# Patient Record
Sex: MALE | Race: WHITE | NOT HISPANIC OR LATINO | Employment: OTHER | URBAN - NONMETROPOLITAN AREA
[De-identification: names, ages, dates, MRNs, and addresses within clinical notes are randomized per-mention and may not be internally consistent; named-entity substitution may affect disease eponyms.]

---

## 2017-09-04 ENCOUNTER — APPOINTMENT (OUTPATIENT)
Dept: CT IMAGING | Facility: HOSPITAL | Age: 62
End: 2017-09-04

## 2017-09-04 ENCOUNTER — APPOINTMENT (OUTPATIENT)
Dept: ULTRASOUND IMAGING | Facility: HOSPITAL | Age: 62
End: 2017-09-04

## 2017-09-04 ENCOUNTER — HOSPITAL ENCOUNTER (EMERGENCY)
Facility: HOSPITAL | Age: 62
Discharge: HOME | End: 2017-09-04
Attending: EMERGENCY MEDICINE | Admitting: EMERGENCY MEDICINE

## 2017-09-04 VITALS
RESPIRATION RATE: 18 BRPM | WEIGHT: 315 LBS | HEIGHT: 73 IN | SYSTOLIC BLOOD PRESSURE: 120 MMHG | BODY MASS INDEX: 41.75 KG/M2 | DIASTOLIC BLOOD PRESSURE: 70 MMHG | TEMPERATURE: 97.7 F | OXYGEN SATURATION: 94 % | HEART RATE: 66 BPM

## 2017-09-04 DIAGNOSIS — N50.89 SCROTAL EDEMA: Primary | ICD-10-CM

## 2017-09-04 LAB
ALBUMIN SERPL-MCNC: 3.8 G/DL (ref 3.5–5)
ALBUMIN/GLOB SERPL: 1.2 G/DL (ref 1–2)
ALP SERPL-CCNC: 60 U/L (ref 38–126)
ALT SERPL W P-5'-P-CCNC: 34 U/L (ref 13–69)
ANION GAP SERPL CALCULATED.3IONS-SCNC: 13.5 MMOL/L
AST SERPL-CCNC: 26 U/L (ref 15–46)
BASOPHILS # BLD AUTO: 0.07 10*3/MM3 (ref 0–0.2)
BASOPHILS NFR BLD AUTO: 1 % (ref 0–2.5)
BILIRUB SERPL-MCNC: 1.3 MG/DL (ref 0.2–1.3)
BILIRUB UR QL STRIP: NEGATIVE
BUN BLD-MCNC: 17 MG/DL (ref 7–20)
BUN/CREAT SERPL: 13.1 (ref 6.3–21.9)
CALCIUM SPEC-SCNC: 9.4 MG/DL (ref 8.4–10.2)
CHLORIDE SERPL-SCNC: 102 MMOL/L (ref 98–107)
CLARITY UR: CLEAR
CO2 SERPL-SCNC: 25 MMOL/L (ref 26–30)
COLOR UR: YELLOW
CREAT BLD-MCNC: 1.3 MG/DL (ref 0.6–1.3)
DEPRECATED RDW RBC AUTO: 52.3 FL (ref 37–54)
EOSINOPHIL # BLD AUTO: 0.04 10*3/MM3 (ref 0–0.7)
EOSINOPHIL NFR BLD AUTO: 0.5 % (ref 0–7)
ERYTHROCYTE [DISTWIDTH] IN BLOOD BY AUTOMATED COUNT: 15.5 % (ref 11.5–14.5)
GFR SERPL CREATININE-BSD FRML MDRD: 56 ML/MIN/1.73
GLOBULIN UR ELPH-MCNC: 3.2 GM/DL
GLUCOSE BLD-MCNC: 93 MG/DL (ref 74–98)
GLUCOSE UR STRIP-MCNC: NEGATIVE MG/DL
HCT VFR BLD AUTO: 47.8 % (ref 42–52)
HGB BLD-MCNC: 16.5 G/DL (ref 14–18)
HGB UR QL STRIP.AUTO: NEGATIVE
IMM GRANULOCYTES # BLD: 0.01 10*3/MM3 (ref 0–0.06)
IMM GRANULOCYTES NFR BLD: 0.1 % (ref 0–0.6)
KETONES UR QL STRIP: NEGATIVE
LEUKOCYTE ESTERASE UR QL STRIP.AUTO: NEGATIVE
LYMPHOCYTES # BLD AUTO: 1.85 10*3/MM3 (ref 0.6–3.4)
LYMPHOCYTES NFR BLD AUTO: 25.3 % (ref 10–50)
MCH RBC QN AUTO: 31.9 PG (ref 27–31)
MCHC RBC AUTO-ENTMCNC: 34.5 G/DL (ref 30–37)
MCV RBC AUTO: 92.3 FL (ref 80–94)
MONOCYTES # BLD AUTO: 0.7 10*3/MM3 (ref 0–0.9)
MONOCYTES NFR BLD AUTO: 9.6 % (ref 0–12)
NEUTROPHILS # BLD AUTO: 4.64 10*3/MM3 (ref 2–6.9)
NEUTROPHILS NFR BLD AUTO: 63.5 % (ref 37–80)
NITRITE UR QL STRIP: NEGATIVE
NRBC BLD MANUAL-RTO: 0 /100 WBC (ref 0–0)
PH UR STRIP.AUTO: 7 [PH] (ref 5–8)
PLATELET # BLD AUTO: 179 10*3/MM3 (ref 130–400)
PMV BLD AUTO: 10.3 FL (ref 6–12)
POTASSIUM BLD-SCNC: 4.5 MMOL/L (ref 3.5–5.1)
PROT SERPL-MCNC: 7 G/DL (ref 6.3–8.2)
PROT UR QL STRIP: NEGATIVE
RBC # BLD AUTO: 5.18 10*6/MM3 (ref 4.7–6.1)
SODIUM BLD-SCNC: 136 MMOL/L (ref 137–145)
SP GR UR STRIP: 1.01 (ref 1–1.03)
UROBILINOGEN UR QL STRIP: NORMAL
WBC NRBC COR # BLD: 7.31 10*3/MM3 (ref 4.8–10.8)

## 2017-09-04 PROCEDURE — 99284 EMERGENCY DEPT VISIT MOD MDM: CPT

## 2017-09-04 PROCEDURE — 96360 HYDRATION IV INFUSION INIT: CPT

## 2017-09-04 PROCEDURE — 0 IOPAMIDOL 61 % SOLUTION: Performed by: EMERGENCY MEDICINE

## 2017-09-04 PROCEDURE — 81003 URINALYSIS AUTO W/O SCOPE: CPT | Performed by: PHYSICIAN ASSISTANT

## 2017-09-04 PROCEDURE — 85025 COMPLETE CBC W/AUTO DIFF WBC: CPT | Performed by: PHYSICIAN ASSISTANT

## 2017-09-04 PROCEDURE — 96361 HYDRATE IV INFUSION ADD-ON: CPT

## 2017-09-04 PROCEDURE — 74177 CT ABD & PELVIS W/CONTRAST: CPT

## 2017-09-04 PROCEDURE — 80053 COMPREHEN METABOLIC PANEL: CPT | Performed by: PHYSICIAN ASSISTANT

## 2017-09-04 PROCEDURE — 76870 US EXAM SCROTUM: CPT

## 2017-09-04 RX ORDER — CLINDAMYCIN HYDROCHLORIDE 150 MG/1
450 CAPSULE ORAL 3 TIMES DAILY
Qty: 90 CAPSULE | Refills: 0 | Status: SHIPPED | OUTPATIENT
Start: 2017-09-04 | End: 2021-03-03 | Stop reason: ALTCHOICE

## 2017-09-04 RX ORDER — CLINDAMYCIN HYDROCHLORIDE 150 MG/1
600 CAPSULE ORAL ONCE
Status: COMPLETED | OUTPATIENT
Start: 2017-09-04 | End: 2017-09-04

## 2017-09-04 RX ORDER — SODIUM CHLORIDE 0.9 % (FLUSH) 0.9 %
10 SYRINGE (ML) INJECTION AS NEEDED
Status: DISCONTINUED | OUTPATIENT
Start: 2017-09-04 | End: 2017-09-04 | Stop reason: HOSPADM

## 2017-09-04 RX ADMIN — IOPAMIDOL 100 ML: 612 INJECTION, SOLUTION INTRAVENOUS at 19:09

## 2017-09-04 RX ADMIN — CLINDAMYCIN HYDROCHLORIDE 600 MG: 150 CAPSULE ORAL at 21:09

## 2017-09-04 RX ADMIN — SODIUM CHLORIDE 1000 ML: 9 INJECTION, SOLUTION INTRAVENOUS at 18:06

## 2017-09-05 NOTE — ED PROVIDER NOTES
Subjective   HPI Comments: Patient has noticed some scrotal swelling without obvious pain for about a week.  He was concerned he developed a hernia because he was working on some heavy equipment but never actually fell or sustained blunt trauma.  He is a large statured male but still morbidly obese.  He has no abdominal or testicular pain.  Denies fever or chills.  No rash.  He denies any issues with urinating or his bowels.  He says that the scrotum swelling seems to be a little bit better when he wakes up in the morning but worsens by evening.  He said he's never had this happen before.  He has no specific urological history.      Review of Systems   Constitutional: Negative.    HENT: Negative.    Eyes: Negative.    Cardiovascular: Negative.    Gastrointestinal: Negative.    Endocrine: Negative.    Genitourinary: Negative.  Negative for difficulty urinating, dysuria, flank pain, frequency, genital sores and hematuria.   Musculoskeletal: Negative.    Allergic/Immunologic: Negative.    Neurological: Negative.    Hematological: Negative.    Psychiatric/Behavioral: Negative.    All other systems reviewed and are negative.      Past Medical History:   Diagnosis Date   • Disease of thyroid gland    • History of intravascular stent placement    • Hypertension    • Pacemaker        Allergies   Allergen Reactions   • Valium [Diazepam] Delirium       Past Surgical History:   Procedure Laterality Date   • TONSILLECTOMY         Family History   Problem Relation Age of Onset   • Heart attack Mother    • Heart attack Father    • No Known Problems Sister        Social History     Social History   • Marital status:      Spouse name: N/A   • Number of children: N/A   • Years of education: N/A     Social History Main Topics   • Smoking status: Never Smoker   • Smokeless tobacco: None   • Alcohol use No   • Drug use: No   • Sexual activity: Not Asked     Other Topics Concern   • None     Social History Narrative   • None            Objective   Physical Exam   Constitutional: He is oriented to person, place, and time. He appears well-developed and well-nourished. No distress.   Patient is a very pleasant, stocky but morbidly obese  male no acute distress   HENT:   Head: Normocephalic and atraumatic.   Eyes: Conjunctivae and EOM are normal. Right eye exhibits no discharge. Left eye exhibits no discharge. No scleral icterus.   Neck: Neck supple. No JVD present.   Cardiovascular: Normal rate, regular rhythm and normal heart sounds.    No murmur heard.  Pulmonary/Chest: Effort normal and breath sounds normal. No respiratory distress. He has no wheezes. He has no rales.   Abdominal: He exhibits no mass. There is no tenderness. No hernia.   Abdomen is markedly protuberant.  There is no obvious cellulitis or ulcerations over the anterior aspect I also examined Pannus and there is no significant erythema or obvious infection.  He has no abdominal tenderness.  Bowel sounds are present.   Genitourinary:   Genitourinary Comments: External genitalia examined scrotum is moderately but uniformly swollen.  No erythema, warmth or obvious rash i.e. insect bite etc.  I'm not able to palpate any type of a discrete mass.  There is no significant tenderness.  Testes are difficult to palpate because of the edema.  There is no tenderness of the inguinal region bilaterally and no obvious lymph nodes   Musculoskeletal: Normal range of motion. He exhibits edema (1+ ANKLE EDEMA).   Neurological: He is alert and oriented to person, place, and time. He displays normal reflexes. No cranial nerve deficit.   Skin: Skin is warm and dry. No rash noted. He is not diaphoretic. No erythema. No pallor.   Psychiatric: He has a normal mood and affect. His behavior is normal. Thought content normal.   Nursing note and vitals reviewed.      Procedures         ED Course  ED Course   Comment By Time   I spoke with the urologist regarding the patient's ultrasound result  and clinical findings.  Labs, urine and CT were recommended to rule out any type of obvious cellulitis or lymphatic obstruction. Kyle Layne PA-C 09/04 1743   Labs have returned and look good.  Currently awaiting CT Kyle Layen PA-C 09/04 1907   CT scan shows a small amount of abdominal ascites as well as a mild amount of stranding within the lower anterior abdominal wall.  The patient's lower abdomen shows no obvious sign of cellulitis and no open wounds or drainage.  Because of this and the scrotal edema I'm going to go ahead and cover him with an antibiotic and have him follow-up with urology.  He tells me he does not have a primary care provider.  He tells me he has a cardiologist but no PCP.  I'm going to give him a dose of clindamycin here and a prescription to fill in the morning.  Currently his scrotum appears normal and is uniform in appearance with no obvious insect bites, redness, erythema, ulcerations etc.  As stated earlier he really doesn't complain of pain just being a little uncomfortable because of the swelling and the  chafing on his legs.  I recommended he try to keep the scrotum elevated with a towel or small pillow at home.  He goes on to tell me that he noticed the swelling is a little better in the morning.  Hopefully with the antibiotic and elevating the scrotum this should improve.  I told him he needs to get a family physician immediately.  For now, I'll have him see Dr. Brown to see if he can add any further input. Kyle Layne PA-C 09/04 2107                  [facility]    Final diagnoses:   Scrotal edema            Kyle Layne PA-C  09/04/17 2119

## 2017-09-06 ENCOUNTER — OFFICE VISIT (OUTPATIENT)
Dept: UROLOGY | Facility: CLINIC | Age: 62
End: 2017-09-06

## 2017-09-06 VITALS
TEMPERATURE: 98.5 F | SYSTOLIC BLOOD PRESSURE: 148 MMHG | OXYGEN SATURATION: 96 % | HEART RATE: 85 BPM | WEIGHT: 315 LBS | DIASTOLIC BLOOD PRESSURE: 75 MMHG | BODY MASS INDEX: 49.87 KG/M2

## 2017-09-06 DIAGNOSIS — N50.89 SCROTAL EDEMA: Primary | ICD-10-CM

## 2017-09-06 DIAGNOSIS — R18.8 OTHER ASCITES: ICD-10-CM

## 2017-09-06 LAB
ALBUMIN SERPL-MCNC: 3.7 G/DL (ref 3.5–5)
ALBUMIN/GLOB SERPL: 1.2 G/DL (ref 1–2)
ALP SERPL-CCNC: 57 U/L (ref 38–126)
ALT SERPL-CCNC: 32 U/L (ref 13–69)
AST SERPL-CCNC: 27 U/L (ref 15–46)
BILIRUB BLD-MCNC: NEGATIVE MG/DL
BILIRUB SERPL-MCNC: 1.1 MG/DL (ref 0.2–1.3)
BUN SERPL-MCNC: 16 MG/DL (ref 7–20)
BUN/CREAT SERPL: 12.3 (ref 6.3–21.9)
CALCIUM SERPL-MCNC: 9.7 MG/DL (ref 8.4–10.2)
CHLORIDE SERPL-SCNC: 103 MMOL/L (ref 98–107)
CLARITY, POC: CLEAR
CO2 SERPL-SCNC: 26 MMOL/L (ref 26–30)
COLOR UR: YELLOW
CREAT SERPL-MCNC: 1.3 MG/DL (ref 0.6–1.3)
GLOBULIN SER CALC-MCNC: 3.2 GM/DL
GLUCOSE SERPL-MCNC: 103 MG/DL (ref 74–98)
GLUCOSE UR STRIP-MCNC: NEGATIVE MG/DL
KETONES UR QL: NEGATIVE
LEUKOCYTE EST, POC: NEGATIVE
NITRITE UR-MCNC: NEGATIVE MG/ML
PH UR: 6 [PH] (ref 5–8)
POTASSIUM SERPL-SCNC: 4.8 MMOL/L (ref 3.5–5.1)
PROT SERPL-MCNC: 6.9 G/DL (ref 6.3–8.2)
PROT UR STRIP-MCNC: NEGATIVE MG/DL
PSA SERPL-MCNC: 0.91 NG/ML (ref 0.06–4)
RBC # UR STRIP: NEGATIVE /UL
SODIUM SERPL-SCNC: 138 MMOL/L (ref 137–145)
SP GR UR: 1.02 (ref 1–1.03)
UROBILINOGEN UR QL: NORMAL

## 2017-09-06 PROCEDURE — 81003 URINALYSIS AUTO W/O SCOPE: CPT | Performed by: UROLOGY

## 2017-09-06 PROCEDURE — 99203 OFFICE O/P NEW LOW 30 MIN: CPT | Performed by: UROLOGY

## 2017-09-06 RX ORDER — POTASSIUM CHLORIDE 1.5 G/1.77G
20 POWDER, FOR SOLUTION ORAL 2 TIMES DAILY
COMMUNITY
End: 2021-03-03 | Stop reason: ALTCHOICE

## 2017-09-06 RX ORDER — SPIRONOLACTONE 100 MG/1
100 TABLET, FILM COATED ORAL DAILY
COMMUNITY
End: 2021-03-03 | Stop reason: ALTCHOICE

## 2017-09-06 RX ORDER — ALLOPURINOL 100 MG/1
100 TABLET ORAL DAILY
COMMUNITY

## 2017-09-06 RX ORDER — PRAVASTATIN SODIUM 10 MG
10 TABLET ORAL DAILY
COMMUNITY
End: 2021-03-03 | Stop reason: ALTCHOICE

## 2017-09-06 RX ORDER — COLCHICINE 0.6 MG/1
0.6 TABLET ORAL DAILY
COMMUNITY

## 2017-09-06 RX ORDER — LEVOTHYROXINE SODIUM 0.1 MG/1
100 TABLET ORAL DAILY
COMMUNITY
End: 2021-03-03 | Stop reason: ALTCHOICE

## 2017-09-06 RX ORDER — NITROGLYCERIN 400 UG/1
1 SPRAY ORAL
COMMUNITY
End: 2021-03-03 | Stop reason: ALTCHOICE

## 2017-09-06 RX ORDER — CARVEDILOL 12.5 MG/1
12.5 TABLET ORAL 2 TIMES DAILY WITH MEALS
COMMUNITY
End: 2021-03-03 | Stop reason: ALTCHOICE

## 2017-09-06 RX ORDER — HYDROCHLOROTHIAZIDE 25 MG/1
25 TABLET ORAL DAILY
COMMUNITY

## 2017-09-06 RX ORDER — ASPIRIN 325 MG
325 TABLET ORAL DAILY
COMMUNITY
End: 2021-03-03 | Stop reason: ALTCHOICE

## 2017-09-06 RX ORDER — MINOXIDIL 10 MG/1
10 TABLET ORAL DAILY
COMMUNITY
End: 2022-05-10

## 2017-09-06 NOTE — PROGRESS NOTES
Chief Complaint  No chief complaint on file.  Scrotal edema      BELKIS Silva is a 62 y.o. male who is referred by the emergency room for evaluation of scrotal edema.  The patient had a ultrasound showing normal testicles and this is edema of the scrotal wall.  The overlying skin is perfectly normal to palpation without visible signs of cellulitis or breaking the skin.  He underwent a CT scan which revealed ascites and some changes in the lower abdominal wall but was otherwise within normal limits.  The patient specifically denies any difficulty voiding or prior prostate problems.  He has seen Dr. Cole of cardiology who referred him to nephrology for chronic renal insufficiency.    There were no vitals filed for this visit.    Past Medical History  Past Medical History:   Diagnosis Date   • Disease of thyroid gland    • History of intravascular stent placement    • Hypertension    • Pacemaker        Past Surgical History  Past Surgical History:   Procedure Laterality Date   • TONSILLECTOMY         Medications  has a current medication list which includes the following prescription(s): clindamycin.      Allergies  Allergies   Allergen Reactions   • Valium [Diazepam] Delirium       Social History  Social History     Social History Narrative   • No narrative on file       Family History  He has no family history of bladder or kidney cancer  He has no family history of kidney stones      AUA Symptom Score:      Review of Systems  Review of Systems  Positive for feeling tired weight gain weight loss nasal discharge frequent urination easy bleeding negative in all other categories  Physical Exam  Physical Exam   Constitutional: He is oriented to person, place, and time. He appears well-developed and well-nourished.   HENT:   Head: Normocephalic and atraumatic.   Neck: Normal range of motion.   Pulmonary/Chest: Effort normal. No respiratory distress.   Abdominal: Soft. He exhibits no distension and no mass. There is no  tenderness. No hernia. Hernia confirmed negative in the left inguinal area.   Genitourinary: Rectum normal, prostate normal, testes normal and penis normal.         Musculoskeletal: Normal range of motion.   Lymphadenopathy:     He has no cervical adenopathy. No inguinal adenopathy noted on the right or left side.   Neurological: He is alert and oriented to person, place, and time.   Skin: Skin is warm and dry.   Psychiatric: He has a normal mood and affect. His behavior is normal.   Vitals reviewed.      Labs Recent and today in the office:  Results for orders placed or performed during the hospital encounter of 09/04/17   Comprehensive Metabolic Panel   Result Value Ref Range    Glucose 93 74 - 98 mg/dL    BUN 17 7 - 20 mg/dL    Creatinine 1.30 0.60 - 1.30 mg/dL    Sodium 136 (L) 137 - 145 mmol/L    Potassium 4.5 3.5 - 5.1 mmol/L    Chloride 102 98 - 107 mmol/L    CO2 25.0 (L) 26.0 - 30.0 mmol/L    Calcium 9.4 8.4 - 10.2 mg/dL    Total Protein 7.0 6.3 - 8.2 g/dL    Albumin 3.80 3.50 - 5.00 g/dL    ALT (SGPT) 34 13 - 69 U/L    AST (SGOT) 26 15 - 46 U/L    Alkaline Phosphatase 60 38 - 126 U/L    Total Bilirubin 1.3 0.2 - 1.3 mg/dL    eGFR Non African Amer 56 (L) >60 mL/min/1.73    Globulin 3.2 gm/dL    A/G Ratio 1.2 1.0 - 2.0 g/dL    BUN/Creatinine Ratio 13.1 6.3 - 21.9    Anion Gap 13.5 mmol/L   Urinalysis With / Culture If Indicated   Result Value Ref Range    Color, UA Yellow Yellow, Straw    Appearance, UA Clear Clear    pH, UA 7.0 5.0 - 8.0    Specific Gravity, UA 1.015 1.005 - 1.030    Glucose, UA Negative Negative    Ketones, UA Negative Negative    Bilirubin, UA Negative Negative    Blood, UA Negative Negative    Protein, UA Negative Negative    Leuk Esterase, UA Negative Negative    Nitrite, UA Negative Negative    Urobilinogen, UA 0.2 E.U./dL 0.2 - 1.0 E.U./dL   CBC Auto Differential   Result Value Ref Range    WBC 7.31 4.80 - 10.80 10*3/mm3    RBC 5.18 4.70 - 6.10 10*6/mm3    Hemoglobin 16.5 14.0 - 18.0  g/dL    Hematocrit 47.8 42.0 - 52.0 %    MCV 92.3 80.0 - 94.0 fL    MCH 31.9 (H) 27.0 - 31.0 pg    MCHC 34.5 30.0 - 37.0 g/dL    RDW 15.5 (H) 11.5 - 14.5 %    RDW-SD 52.3 37.0 - 54.0 fl    MPV 10.3 6.0 - 12.0 fL    Platelets 179 130 - 400 10*3/mm3    Neutrophil % 63.5 37.0 - 80.0 %    Lymphocyte % 25.3 10.0 - 50.0 %    Monocyte % 9.6 0.0 - 12.0 %    Eosinophil % 0.5 0.0 - 7.0 %    Basophil % 1.0 0.0 - 2.5 %    Immature Grans % 0.1 0.0 - 0.6 %    Neutrophils, Absolute 4.64 2.00 - 6.90 10*3/mm3    Lymphocytes, Absolute 1.85 0.60 - 3.40 10*3/mm3    Monocytes, Absolute 0.70 0.00 - 0.90 10*3/mm3    Eosinophils, Absolute 0.04 0.00 - 0.70 10*3/mm3    Basophils, Absolute 0.07 0.00 - 0.20 10*3/mm3    Immature Grans, Absolute 0.01 0.00 - 0.06 10*3/mm3    nRBC 0.0 0.0 - 0.0 /100 WBC         Assessment & Plan    He volunteers that the edema on the right side is much improved.  Patient is morbidly obese with a very large protuberant abdomen but it is palpably normal with no tenderness or signs of inflammation where the CT scan was abnormal.  He is currently followed by nephrology and I suggested he check back with him to see if there is any renal region for ascites.  He also needs to get a primary care provider.    There is no evidence of  pathology with clear urine and normal testicles and a normal urinary tract on CT scan so I feel this scrotal edema is a reflection of the patient's ascites which is a non-urological problem.

## 2021-03-03 ENCOUNTER — OFFICE VISIT (OUTPATIENT)
Dept: ENDOCRINOLOGY | Facility: CLINIC | Age: 66
End: 2021-03-03

## 2021-03-03 VITALS
DIASTOLIC BLOOD PRESSURE: 74 MMHG | HEIGHT: 73 IN | BODY MASS INDEX: 41.75 KG/M2 | HEART RATE: 68 BPM | SYSTOLIC BLOOD PRESSURE: 110 MMHG | WEIGHT: 315 LBS

## 2021-03-03 DIAGNOSIS — E03.9 ACQUIRED HYPOTHYROIDISM: Primary | Chronic | ICD-10-CM

## 2021-03-03 PROCEDURE — 99442 PR PHYS/QHP TELEPHONE EVALUATION 11-20 MIN: CPT | Performed by: PHYSICIAN ASSISTANT

## 2021-03-03 RX ORDER — MULTIVIT-MIN/IRON/FOLIC ACID/K 18-600-40
CAPSULE ORAL
COMMUNITY

## 2021-03-03 RX ORDER — FUROSEMIDE 40 MG/1
TABLET ORAL
COMMUNITY

## 2021-03-03 RX ORDER — SPIRONOLACTONE 25 MG/1
TABLET ORAL DAILY
COMMUNITY
Start: 2021-01-08

## 2021-03-03 RX ORDER — LEVOTHYROXINE SODIUM 137 UG/1
274 TABLET ORAL DAILY
Qty: 180 TABLET | Refills: 3 | Status: SHIPPED | OUTPATIENT
Start: 2021-03-03 | End: 2021-07-14 | Stop reason: SDUPTHER

## 2021-03-03 RX ORDER — SACUBITRIL AND VALSARTAN 24; 26 MG/1; MG/1
TABLET, FILM COATED ORAL 2 TIMES DAILY
COMMUNITY
Start: 2021-02-10

## 2021-03-03 RX ORDER — NITROGLYCERIN 0.4 MG/1
TABLET SUBLINGUAL
COMMUNITY

## 2021-03-03 RX ORDER — PRAVASTATIN SODIUM 40 MG
TABLET ORAL
COMMUNITY
Start: 2021-01-08

## 2021-03-03 RX ORDER — LEVOTHYROXINE SODIUM 137 UG/1
274 TABLET ORAL DAILY
COMMUNITY
End: 2021-03-03 | Stop reason: SDUPTHER

## 2021-03-03 NOTE — PROGRESS NOTES
"     Office Note      Date: 2021  Patient Name: Parish Silva  MRN: [id number]  : 1955    Chief Complaint   Patient presents with   • Hypothyroidism     You have chosen to receive care through a telephone visit. Do you consent to use a telephone visit for your medical care today? yes    History of Present Illness:   Parish Silva is a 65 y.o. male who presents today for hypothyroidism.  He remains on levothyroxine 274 mcg/day.  He reports taking this correctly and regularly.  He reports that he feels okay.  He denies symptoms of hypo or hyperthyroidism at this time.  He has not noticed any changes in the size of his neck.  He denies any compressive symptoms.  Last TSH on 10/7/2020 was 2.37.  He reports seeing nephrologist.  He reports being told that labs look good and to follow up in 1 year.  He reports seeing cardiologist.  He reports being stable from a cardiac standpoint.    Subjective      Review of Systems:   Review of Systems   Constitutional: Negative.    HENT: Negative.    Cardiovascular: Negative.    Endocrine: Negative.    Neurological: Negative.        The following portions of the patient's history were reviewed and updated as appropriate: allergies, current medications, past family history, past medical history, past social history, past surgical history and problem list.    Objective     Vitals:    21 1306   BP: 110/74   Pulse: 68   Weight: (!) 165 kg (364 lb)   Height: 185.4 cm (73\")   PainSc:   4     Body mass index is 48.02 kg/m².    Physical Exam      Current Outpatient Medications   Medication Instructions   • allopurinol (ZYLOPRIM) 100 mg, Oral, Daily   • apixaban (Eliquis) 5 MG tablet tablet Every 12 Hours Scheduled   • Cholecalciferol (Vitamin D) 50 MCG ( UT) capsule vitamin D3 2,000 unit-folic acid 1 mg tablet   Take 1 tablet every day by oral route.   • colchicine 0.6 mg, Oral, Daily   • Entresto 24-26 MG tablet 2 times daily   • furosemide (LASIX) 40 MG tablet " furosemide 40 mg tablet   Take 1 tablet every day by oral route.   • hydroCHLOROthiazide (HYDRODIURIL) 25 mg, Oral, Daily   • levothyroxine (SYNTHROID, LEVOTHROID) 274 mcg, Oral, Daily   • minoxidil (LONITEN) 10 mg, Oral, Daily, 1/2 tablet daily   • nitroglycerin (NITROSTAT) 0.4 MG SL tablet nitroglycerin 0.4 mg sublingual tablet   Place 1 tablet as needed by sublingual route as directed.   • pravastatin (PRAVACHOL) 40 MG tablet 2 tablets daily   • spironolactone (ALDACTONE) 25 MG tablet Daily       Assessment / Plan      Assessment & Plan:  1. Acquired hypothyroidism  Clinically euthyroid.  Continue current dose of levothyroxine.  Lab order mailed for TSH - he will have this done in 3 months along with other labs for cardiologist.  - levothyroxine (SYNTHROID, LEVOTHROID) 137 MCG tablet; Take 2 tablets by mouth Daily.  Dispense: 180 tablet; Refill: 3  - TSH; Future      11 minutes spent on phone with patient.    Return in about 9 months (around 12/3/2021) for Next scheduled follow up.     SUGEY Rodriguez  03/03/2021

## 2021-07-14 ENCOUNTER — TELEPHONE (OUTPATIENT)
Dept: ENDOCRINOLOGY | Facility: CLINIC | Age: 66
End: 2021-07-14

## 2021-07-14 DIAGNOSIS — E03.9 ACQUIRED HYPOTHYROIDISM: Chronic | ICD-10-CM

## 2021-07-14 RX ORDER — LEVOTHYROXINE SODIUM 137 UG/1
274 TABLET ORAL DAILY
Qty: 180 TABLET | Refills: 3 | Status: SHIPPED | OUTPATIENT
Start: 2021-07-14 | End: 2022-05-11 | Stop reason: SDUPTHER

## 2021-07-14 NOTE — TELEPHONE ENCOUNTER
Called wife and she stated he did not get them done due to hip and leg problems. She was told refills were sent.

## 2021-07-14 NOTE — TELEPHONE ENCOUNTER
I sent in a year of refills in March, so the pharmacy should have the newer prescription on file.  Anyway… I will send in again.  He was supposed to have labs done (TSH) last month.  I have not received results.  Please see if this was done.  Thank you.

## 2021-07-14 NOTE — TELEPHONE ENCOUNTER
PT'S WIFE CALLED REQUESTING A REFILL OF LEVOTHYROXINE TO BE SENT IN TO [facility]. PLEASE AND THNAK YOU    PT IS OUT OF MEDICATION

## 2021-07-14 NOTE — TELEPHONE ENCOUNTER
Sorry to hear that he is having problems.  His level was okay when last tested.  He still has the order, correct?  He should be able to have this done within the next couple of months?  Thank you.

## 2021-07-28 ENCOUNTER — TELEPHONE (OUTPATIENT)
Dept: ENDOCRINOLOGY | Facility: CLINIC | Age: 66
End: 2021-07-28

## 2021-07-28 NOTE — TELEPHONE ENCOUNTER
Please let him know that his thyroid level was okay.  The TSH was 5.65 (ref range 0.27-4.2).  This is just mildly out of range, so recommend to continue current dose of levothyroxine.  I sent in refills 2 weeks ago, so he should have plenty of medication.  Recommend to schedule follow up for November/December.  Thank you.

## 2022-05-10 ENCOUNTER — LAB (OUTPATIENT)
Dept: LAB | Facility: HOSPITAL | Age: 67
End: 2022-05-10

## 2022-05-10 ENCOUNTER — OFFICE VISIT (OUTPATIENT)
Dept: ENDOCRINOLOGY | Facility: CLINIC | Age: 67
End: 2022-05-10

## 2022-05-10 VITALS
WEIGHT: 315 LBS | HEIGHT: 73 IN | BODY MASS INDEX: 41.75 KG/M2 | HEART RATE: 93 BPM | OXYGEN SATURATION: 95 % | DIASTOLIC BLOOD PRESSURE: 83 MMHG | SYSTOLIC BLOOD PRESSURE: 124 MMHG

## 2022-05-10 DIAGNOSIS — E03.9 ACQUIRED HYPOTHYROIDISM: Primary | Chronic | ICD-10-CM

## 2022-05-10 PROCEDURE — 99213 OFFICE O/P EST LOW 20 MIN: CPT | Performed by: PHYSICIAN ASSISTANT

## 2022-05-10 RX ORDER — CARVEDILOL 25 MG/1
TABLET ORAL
COMMUNITY
Start: 2022-05-09

## 2022-05-10 RX ORDER — MINOXIDIL 2.5 MG/1
TABLET ORAL
COMMUNITY
Start: 2022-04-28

## 2022-05-10 NOTE — PROGRESS NOTES
"     Office Note      Date: 05/10/2022  Patient Name: Parish Silva  MRN: [id number]  : 1955    Chief Complaint   Patient presents with   • Hypothyroidism       History of Present Illness:   Parish Silva is a 66 y.o. male who presents today for follow up on hypothyroidism.  He remains on levothyroxine 274 mcg daily.  He reports taking this on empty stomach every morning, typically 30 minutes before eating.  He takes other medications along with the levothyroxine.  He denies any significant symptoms of hypo or hyperthyroidism at this time.  He has not noticed any change in the size of his neck.  He reports no compressive symptoms.  He had Covid-19 in January.  He reports only symptom was loss of taste.   He is having difficulty walking due to hip.  Planning for left hip replacement.  He saw cardiologist yesterday.  He had routine echo.  He is being scheduled for pacemaker battery replacement.      Subjective        Past Medical History:   Diagnosis Date   • Atrial fibrillation (HCC)    • CAD (coronary artery disease)     s/p stent 2006   • Cardiomyopathy (HCC)    • Congestive heart failure (CHF) (HCC)    • Gout    • History of intravascular stent placement    • Hypertension    • Hypothyroidism    • Influenza vaccine needed    • Obesity    • Pacemaker       Past Surgical History:   Procedure Laterality Date   • CAROTID STENT     • PACEMAKER IMPLANTATION     • TONSILLECTOMY         The following portions of the patient's history were reviewed and updated as appropriate: allergies, current medications, past family history, past medical history, past social history, past surgical history and problem list.    Objective     Vitals:    05/10/22 0849   BP: 124/83   Pulse: 93   SpO2: 95%   Weight: (!) 164 kg (361 lb)   Height: 185.4 cm (73\")     Body mass index is 47.63 kg/m².    Physical Exam  Vitals reviewed.   Constitutional:       General: He is not in acute distress.  Neck:      Thyroid: No thyroid mass, " thyromegaly or thyroid tenderness.   Lymphadenopathy:      Cervical: No cervical adenopathy.   Neurological:      Mental Status: He is alert and oriented to person, place, and time.   Psychiatric:         Mood and Affect: Mood normal.         Current Outpatient Medications   Medication Instructions   • allopurinol (ZYLOPRIM) 100 mg, Oral, Daily   • apixaban (ELIQUIS) 5 MG tablet tablet Every 12 Hours Scheduled   • carvedilol (COREG) 25 MG tablet No dose, route, or frequency recorded.   • Cholecalciferol (Vitamin D) 50 MCG (2000 UT) capsule vitamin D3 2,000 unit-folic acid 1 mg tablet   Take 1 tablet every day by oral route.   • colchicine 0.6 mg, Oral, Daily   • Entresto 24-26 MG tablet 2 times daily   • furosemide (LASIX) 40 MG tablet furosemide 40 mg tablet   Take 1 tablet every day by oral route.   • hydroCHLOROthiazide (HYDRODIURIL) 25 mg, Oral, Daily   • levothyroxine (SYNTHROID, LEVOTHROID) 274 mcg, Oral, Daily   • minoxidil (LONITEN) 2.5 MG tablet TAKE 2 TABLETS (5 MG) BY ORAL ROUTE ONCE DAILY FOR 30 DAYS   • nitroglycerin (NITROSTAT) 0.4 MG SL tablet nitroglycerin 0.4 mg sublingual tablet   Place 1 tablet as needed by sublingual route as directed.   • pravastatin (PRAVACHOL) 40 MG tablet 2 tablets daily   • spironolactone (ALDACTONE) 25 MG tablet Daily       Assessment / Plan      Assessment & Plan:  1. Acquired hypothyroidism  Clinically euthyroid.  Continue levothyroxine to 274 mcg daily.  Check TFTs today.  Will notify him of results and if dose adjustment is indicated.  - TSH; Future  - T4, Free; Future       Return in about 1 year (around 5/10/2023) for recheck with TFTs. He was advised to contact the office with any interval questions or concerns.    SUGEY Rodriguez  Endocrinology  05/10/2022

## 2022-05-11 DIAGNOSIS — E03.9 ACQUIRED HYPOTHYROIDISM: Chronic | ICD-10-CM

## 2022-05-11 LAB
T4 FREE SERPL-MCNC: 1.81 NG/DL (ref 0.93–1.7)
TSH SERPL DL<=0.005 MIU/L-ACNC: 2.28 UIU/ML (ref 0.27–4.2)

## 2022-05-11 RX ORDER — LEVOTHYROXINE SODIUM 137 UG/1
274 TABLET ORAL DAILY
Qty: 180 TABLET | Refills: 3 | Status: SHIPPED | OUTPATIENT
Start: 2022-05-11

## 2023-04-17 ENCOUNTER — TELEPHONE (OUTPATIENT)
Dept: ENDOCRINOLOGY | Facility: CLINIC | Age: 68
End: 2023-04-17

## 2023-04-17 DIAGNOSIS — E03.9 ACQUIRED HYPOTHYROIDISM: Primary | ICD-10-CM

## 2023-04-17 NOTE — TELEPHONE ENCOUNTER
PT'S WIFE CALLED REQUESTING TO CHANGE THIS PT'S UPCOMING APPT TO A TELE-APPT. SHE REQUESTED A CALL BACK REGARDLESS.

## 2023-04-19 NOTE — TELEPHONE ENCOUNTER
Left message to return call.  Just need to let them know it's okay to change to telehealth appointment and appointment changed over.

## 2023-04-19 NOTE — TELEPHONE ENCOUNTER
Spoke w/ patient he is aware this has been changed. He would like lab orders placed and mailed to him so he can have done prior to his appointment.     Does not need a return call, confirmed address.

## 2023-05-10 ENCOUNTER — OFFICE VISIT (OUTPATIENT)
Dept: ENDOCRINOLOGY | Facility: CLINIC | Age: 68
End: 2023-05-10
Payer: MEDICARE

## 2023-05-10 VITALS
SYSTOLIC BLOOD PRESSURE: 116 MMHG | WEIGHT: 315 LBS | BODY MASS INDEX: 48.02 KG/M2 | DIASTOLIC BLOOD PRESSURE: 75 MMHG | HEART RATE: 84 BPM

## 2023-05-10 DIAGNOSIS — E03.9 ACQUIRED HYPOTHYROIDISM: Chronic | ICD-10-CM

## 2023-05-10 PROCEDURE — 1160F RVW MEDS BY RX/DR IN RCRD: CPT | Performed by: PHYSICIAN ASSISTANT

## 2023-05-10 PROCEDURE — 99213 OFFICE O/P EST LOW 20 MIN: CPT | Performed by: PHYSICIAN ASSISTANT

## 2023-05-10 PROCEDURE — 1159F MED LIST DOCD IN RCRD: CPT | Performed by: PHYSICIAN ASSISTANT

## 2023-05-10 RX ORDER — LEVOTHYROXINE SODIUM 137 UG/1
274 TABLET ORAL DAILY
Qty: 180 TABLET | Refills: 3 | Status: SHIPPED | OUTPATIENT
Start: 2023-05-10

## 2023-05-10 RX ORDER — TICAGRELOR 90 MG/1
TABLET ORAL 2 TIMES DAILY
COMMUNITY
Start: 2023-04-20

## 2023-05-10 NOTE — PROGRESS NOTES
Office Note      Date: 05/10/2023  Patient Name: Parish Silva  MRN: [id number]  : 1955    Chief Complaint   Patient presents with   • Hypothyroidism     You have chosen to receive care through a telephone visit. Do you consent to use a telephone visit for your medical care today? Yes    History of Present Illness  Parish Silva is a 67 y.o. male who presents today for follow up on hypothyroidism.  He remains on levothyroxine 274 mcg daily.  He reports taking this correctly and regularly.  He has not noticed any change in the size of his neck.  He denies compressive symptoms.  Energy level could be better.  No significant weight change.  He reports being sedentary due to hip pain.  Planning for hip replacement no sooner than 2023.  History of CAD, CHF, atrial fibrillation.  He reports having stent placed last year 2022.  He reports that he is on 2 blood thinners now and has been waiting for cardiac clearance with adjustment in medications prior to hip surgery.    Review of systems  As noted in HPI.    Past Medical History:   Diagnosis Date   • Atrial fibrillation    • CAD (coronary artery disease)     s/p stent 2006, 2022   • Cardiomyopathy    • Congestive heart failure (CHF)    • Gout    • History of intravascular stent placement    • Hypertension    • Hypothyroidism    • Influenza vaccine needed    • Obesity    • Pacemaker       Past Surgical History:   Procedure Laterality Date   • CAROTID STENT     • PACEMAKER IMPLANTATION     • TONSILLECTOMY       The following portions of the patient's history were reviewed and updated as appropriate: allergies, current medications, past family history, past medical history, past social history, past surgical history and problem list.    Vitals:  /75   Pulse 84   Wt (!) 165 kg (364 lb)   BMI 48.02 kg/m²     Limited physical exam - telephone visit.  Physical Exam  Neurological:      Mental Status: He is alert and oriented to person, place, and  time.       Labs/Imaging    TSH  Lab Results   Component Value Date    TSH 0.563 05/06/2023        Current Outpatient Medications   Medication Instructions   • allopurinol (ZYLOPRIM) 100 mg, Oral, Daily   • apixaban (ELIQUIS) 5 MG tablet tablet Every 12 Hours Scheduled   • Brilinta 90 MG tablet tablet 2 Times Daily   • carvedilol (COREG) 25 MG tablet No dose, route, or frequency recorded.   • Cholecalciferol (Vitamin D) 50 MCG (2000 UT) capsule vitamin D3 2,000 unit-folic acid 1 mg tablet   Take 1 tablet every day by oral route.   • colchicine 0.6 mg, Oral, Daily   • Entresto 24-26 MG tablet 2 times daily   • furosemide (LASIX) 40 MG tablet furosemide 40 mg tablet   Take 1 tablet every day by oral route.   • hydroCHLOROthiazide (HYDRODIURIL) 25 mg, Oral, Daily   • levothyroxine (SYNTHROID, LEVOTHROID) 274 mcg, Oral, Daily   • minoxidil (LONITEN) 2.5 MG tablet TAKE 2 TABLETS (5 MG) BY ORAL ROUTE ONCE DAILY FOR 30 DAYS   • nitroglycerin (NITROSTAT) 0.4 MG SL tablet nitroglycerin 0.4 mg sublingual tablet   Place 1 tablet as needed by sublingual route as directed.   • pravastatin (PRAVACHOL) 40 MG tablet 2 tablets daily   • spironolactone (ALDACTONE) 25 MG tablet Daily       Assessment & Plan  1. Acquired hypothyroidism  He remains euthyroid with TSH 0.563 on 5/6/2023.  Continue levothyroxine 274 mcg daily.  - levothyroxine (SYNTHROID, LEVOTHROID) 137 MCG tablet; Take 2 tablets by mouth Daily.  Dispense: 180 tablet; Refill: 3    Spoke with patient by phone for 15 minutes.    Return in about 1 year (around 5/10/2024) for next scheduled follow up, sooner if needed. He was advised to contact the office with any interval questions or concerns.    SUGEY Rodriguez  Endocrinology  05/10/2023

## 2024-04-10 ENCOUNTER — TELEPHONE (OUTPATIENT)
Dept: ENDOCRINOLOGY | Facility: CLINIC | Age: 69
End: 2024-04-10

## 2024-04-10 DIAGNOSIS — E03.9 ACQUIRED HYPOTHYROIDISM: Primary | Chronic | ICD-10-CM

## 2024-07-02 DIAGNOSIS — E03.9 ACQUIRED HYPOTHYROIDISM: Chronic | ICD-10-CM

## 2024-07-02 RX ORDER — LEVOTHYROXINE SODIUM 137 UG/1
274 TABLET ORAL DAILY
Qty: 180 TABLET | Refills: 0 | Status: SHIPPED | OUTPATIENT
Start: 2024-07-02

## 2024-07-02 NOTE — TELEPHONE ENCOUNTER
Patient's wife called, patient has a follow up appointment on 8/20, but he will be out of his levorthyroxine before them. Patient's wife is wanting to see if a refill can be sent to [facility] [place] pharmacy to get him through until his appointment. He has about 3 days left.

## 2024-08-20 ENCOUNTER — OFFICE VISIT (OUTPATIENT)
Dept: ENDOCRINOLOGY | Facility: CLINIC | Age: 69
End: 2024-08-20
Payer: MEDICARE

## 2024-08-20 VITALS
SYSTOLIC BLOOD PRESSURE: 124 MMHG | OXYGEN SATURATION: 98 % | DIASTOLIC BLOOD PRESSURE: 84 MMHG | HEART RATE: 63 BPM | WEIGHT: 315 LBS | BODY MASS INDEX: 41.75 KG/M2 | HEIGHT: 73 IN

## 2024-08-20 DIAGNOSIS — E03.9 ACQUIRED HYPOTHYROIDISM: Primary | ICD-10-CM

## 2024-08-20 PROCEDURE — 84443 ASSAY THYROID STIM HORMONE: CPT | Performed by: PHYSICIAN ASSISTANT

## 2024-08-20 PROCEDURE — 99213 OFFICE O/P EST LOW 20 MIN: CPT | Performed by: PHYSICIAN ASSISTANT

## 2024-08-20 PROCEDURE — 1159F MED LIST DOCD IN RCRD: CPT | Performed by: PHYSICIAN ASSISTANT

## 2024-08-20 PROCEDURE — 1160F RVW MEDS BY RX/DR IN RCRD: CPT | Performed by: PHYSICIAN ASSISTANT

## 2024-08-20 PROCEDURE — 36415 COLL VENOUS BLD VENIPUNCTURE: CPT | Performed by: PHYSICIAN ASSISTANT

## 2024-08-20 PROCEDURE — 84439 ASSAY OF FREE THYROXINE: CPT | Performed by: PHYSICIAN ASSISTANT

## 2024-08-20 RX ORDER — DAPAGLIFLOZIN 10 MG/1
10 TABLET, FILM COATED ORAL
COMMUNITY
Start: 2024-08-19

## 2024-08-20 NOTE — PROGRESS NOTES
"     Office Note      Date: 2024  Patient Name: Parish Silva  MRN: [id number]  : 1955    Chief Complaint   Patient presents with    Thyroid Problem     Acquired hypothyroidism         History of Present Illness:   Parish Silva is a 69 y.o. male who presents today for annual follow-up for hypothyroidism.  He was previously followed by Kirsten Chun.  He remains on levothyroxine 274 mcg daily.  He reports he is taking this regularly and correctly.  He reports he was finally able to have his hip replacement surgery November of last year.  He reports it has been a difficult recovery but things are going well now.  He reports overall he feels well.  He sees his cardiologist regularly and continues to see his orthopedist.  He denies any changes in his neck today.    Subjective      Review of Systems:  Review of Systems   Constitutional: Negative.    Cardiovascular: Negative.    Gastrointestinal: Negative.    Endocrine: Negative.    Musculoskeletal:  Positive for myalgias.   Neurological:  Positive for weakness.       The following portions of the patient's history were reviewed and updated as appropriate: allergies, current medications, past family history, past medical history, past social history, past surgical history, and problem list.    Objective     Vitals:    24 1510   BP: 124/84   BP Location: Left arm   Patient Position: Sitting   Cuff Size: Adult   Pulse: 63   SpO2: 98%   Weight: (!) 168 kg (370 lb)   Height: 185.4 cm (73\")     Body mass index is 48.82 kg/m².    Physical Exam  Vitals reviewed.   Constitutional:       General: He is not in acute distress.     Appearance: Normal appearance.   Neck:      Thyroid: No thyroid mass, thyromegaly or thyroid tenderness.   Lymphadenopathy:      Cervical: No cervical adenopathy.   Neurological:      Mental Status: He is alert.         TSH  Lab Results   Component Value Date    TSH 0.778 2023       FREE T4  Lab Results   Component Value Date "    FREET4 1.81 (H) 05/10/2022          Assessment / Plan      Assessment & Plan:  1. Acquired hypothyroidism  TFTs pending today.  Will send note with results and plan.  For now he will continue the levothyroxine 274 mcg daily (137 mcg 2 tablets daily).  I will refill his prescription once his labs have been reviewed.  We will plan to check back in 1 year unless we make adjustments to his dose or anything changes in the interim.  Patient to call as needed.  - T4, Free; Future  - TSH; Future  - T4, Free  - TSH       Return in about 1 year (around 8/20/2025) for Recheck.    This note was dictated using Dragon voice recognition.    Electronically signed by: SUGEY Dobbs  08/20/2024

## 2024-08-21 LAB
T4 FREE SERPL-MCNC: 1.54 NG/DL (ref 0.92–1.68)
TSH SERPL DL<=0.05 MIU/L-ACNC: 4.61 UIU/ML (ref 0.27–4.2)

## 2024-08-22 DIAGNOSIS — E03.9 ACQUIRED HYPOTHYROIDISM: Chronic | ICD-10-CM

## 2024-08-22 RX ORDER — LEVOTHYROXINE SODIUM 137 UG/1
274 TABLET ORAL DAILY
Qty: 180 TABLET | Refills: 1 | Status: SHIPPED | OUTPATIENT
Start: 2024-08-22

## 2024-11-04 DIAGNOSIS — E03.9 ACQUIRED HYPOTHYROIDISM: Chronic | ICD-10-CM

## 2024-11-04 RX ORDER — LEVOTHYROXINE SODIUM 137 UG/1
274 TABLET ORAL DAILY
Qty: 180 TABLET | Refills: 3 | Status: SHIPPED | OUTPATIENT
Start: 2024-11-04

## 2025-01-12 NOTE — TELEPHONE ENCOUNTER
Rx Refill Note  Requested Prescriptions     Pending Prescriptions Disp Refills    levothyroxine (SYNTHROID, LEVOTHROID) 137 MCG tablet 180 tablet 0     Sig: Take 2 tablets by mouth Daily.      Last office visit with prescribing clinician: Visit date not found     Next office visit with prescribing clinician: 8/20/2024                           Sara Layne MA  07/02/24, 15:41 EDT    Teaching Attestation:  I have personally seen and examined the patient. I have directly reviewed the clinical findings, labs, imaging studies and management of this patient in detail.      I agree with the documentation as recorded by the resident doctor with the any exceptions/additions noted below.    Sarina Johnson DO  Internal Medicine, Teaching Faculty            Internal Medicine Discharge Summary    Admission Date: 1/5/2025     Discharge Date: 1/12/2024    Principal Dx: DKA     Secondary Dx: DM type I, gastroparesis, neuropathy     PCP(outpatient): Stevo Martinez CNP    Consultations:   IP CONSULT TO ENDOCRINOLOGY  IP CONSULT TO GI    Procedures:   - EGD 1/10    Imaging Results:   US VASC UPPER EXTREMITY VENOUS DUPLEX RIGHT   Final Result      No evidence of acute DVT of the investigated right upper extremity..               Electronically Signed by: PARKER TRACEY MD    Signed on: 1/10/2025 7:37 PM    Workstation ID: [id number]      Esophagogastroduodenoscopy (EGD)   Final Result      XR CHEST AP OR PA   Final Result   No radiographic evidence of acute cardiopulmonary process.      Electronically Signed by: MARLENA MYLES MD    Signed on: 1/5/2025 9:31 PM    Workstation ID: [id number]           Brief Hospital Course:   Patient presented to the ED for nausea and vomiting and was found to be in DKA 1/5 for she was  admitted to MICU. Patient started on IVF and DKA insulin drip. Endo consulted for management of DKA and insulin pump.  AG closed and patient was transferred to the F on non DKA insulin gtt. GI was consulted for gastroparesis and EGD was done 1/9 which showed  mild gastric retention indicative of dysmotility. Moderate antral erosive gastritis biopsies taken. Patient is to follow up with GI for gastric biopy results and outpatient gastric emptying study. Patient is to take reglan with meals and famotidine upon discharge. During this admission , right arm was swelling with erythema and RUE US  ordered and was negative for DVT. Patient is tolerating a diet with improvement of abdominal discomfort and no more vomiting, passing gas. stable for discharge home per primary team, endo and GI. Patient is to follow up with PCP and endo OP in 2 weeks. Cleared for dc by endo and GI.  If abdominal pain or N/V occurs return to the ED.       Physical Exam:   General: Alert and oriented, No acute distress  HENT: Normocephalic, Moist mucous membranes  Cardiovascular: Normal rate, Regular rhythm  Respiratory: Lungs are clear to ascultation, Respirations are non-labored  Gastrointestinal: Soft, mildly tender  Musculoskeletal: Normal strength, No swelling  Neurological: Normal sensory, No focal neurologic deficits observed    Visit Vitals  /83 (BP Location: LUE - Left upper extremity, Patient Position: Sitting)   Pulse 92   Temp 97.7 °F (36.5 °C) (Oral)   Resp 18   Ht 5' 3\" (1.6 m)   Wt 87.5 kg (192 lb 14.4 oz)   LMP 12/30/2024   SpO2 99%   BMI 34.17 kg/m²         DISCHARGE MEDICATION LIST      Summary of your Discharge Medications        Take these Medications        Details   capsaicin 0.025 % cream  Commonly known as: ZOSTRIX   Apply topically 2 (two) times a day.     Dexcom G6 Sensor Misc   CHANGE EVERY 10 DAYS     Dexcom G6 Transmitter Misc      docusate sodium-sennosides 50-8.6 MG per tablet  Commonly known as: SENOKOT S   Take 1 tablet by mouth nightly as needed for Constipation.     DULoxetine 30 MG capsule  Commonly known as: CYMBALTA   Take 30 mg by mouth daily.     famotidine 20 MG tablet  Commonly known as: PEPCID   Take 1 tablet by mouth daily.     gabapentin 600 MG tablet  Commonly known as: NEURONTIN   Take 600 mg by mouth in the morning and 600 mg at noon and 600 mg in the evening.     Gvoke HypoPen 2-Pack 1 MG/0.2ML Solution Auto-injector   Generic drug: Glucagon  Inject 0.2 mL (1 mg total) under the skin 1 (one) time if needed (hypoglycemia) for up to 8 doses.     insulin lispro 100 UNIT/ML  injectable solution   INJECT UP TO 80 UNITS DAILY VIA INSULIN PUMP    Provider of pump: Juan Wolf     lidocaine 5 % patch  Commonly known as: LIDODERM   Place 1 patch onto the skin daily.     metformin 1000 MG tablet  Commonly known as: GLUCOPHAGE   Take 1,000 mg by mouth in the morning and 1,000 mg in the evening.     metoCLOPramide 10 MG tablet  Commonly known as: REGLAN   Take 1 tablet by mouth in the morning and 1 tablet at noon and 1 tablet in the evening. Take before meals.     ondansetron 4 MG tablet  Commonly known as: Zofran   Take 1 tablet by mouth every 12 hours as needed for Nausea.     polyethylene glycol 17 GM/SCOOP powder  Commonly known as: MIRALAX   Take 17 g by mouth daily as needed (constipation). Stir and dissolve powder in any 4 to 8 ounces of beverage, then drink.             Discharge Instructions:   Thank you for letting us take care of you.     You were admitted at [facility] because of very high blood sugars causing DKA. You were initially admitted to the ICU and treated with a insulin drip and IV fluids.  Subquently you were transferred out the ICU and continue having abdominal pain and nausea even though your DKA had resolved. Therefore, you were seen by GI who recccomended pantoprazole and antinausea medication plus reglan with meals after doing an EGD and seeing retained food and liquid in the stomach. This was a sign that the food and liquid you consume is not moving from the stomach into the intestines as effectively likely from gastroparesis in setting of your diabetes. It is important to take you medications as prescribed.       Follow Up Appointments:  -Please follow-up with your primary care physician, Stevo Martinez CNP within 1-2 weeks for a follow-up check to ensure you are improving, to see if you need any further evaluation/testing, or to evaluate for any alternate diagnoses.    - If increased abdominal pain, persist nausea and vomiting, come back to the  ED.   - Follow up with GI doctor in 2 weeks  - Follow up with Endocrinology in 2 weeks     If you do not have a primary care provider, call [phone] and they will be able to assist you further.     Changes to Medications:  - Start metoclopramide 10 mg TID before meals    - Continue famotidine 20 mg daily   - Mirlax and Senokot as needed for constipation   - Use zofran as needed twice a day for nausea   - Continue your other home medications as prescribed.     Endocrine discharge recommendations:  - Continue metformin 1000 mg twice daily and insulin pump  - Insulin pump changes: Will add an additional setting for when you have decreased appetite \"Marina sick\"  Basal rate 1 unit/hr  ISF 1: 40  ICR 1: 20  Target   Duration 5 hours  - If you note persistently elevated sugars >300 or <70, please call our office at [phone].  - If your appetite improves and you notice elevated sugars (>250-300) on this profile, switch back to original \"Marina\" profile on the pump and call our office.  -Outpatient endocrine follow-up for diabetes and goiter    If you experience numbness or weakness, chest pain, shortness of breath, dizziness, abdominal pain, worsening nausea and vomiting, please go back to the ER or call 911 to take you back to the ER.     We hope you feel better!     Follow-up Information       Follow up With Specialties Details Why Contact Info    Rachid Matute MD Gastroenterology Follow up f/u with GI doctor for biopsy results [address] [place] [address] [address]  [facility] [id number] [phone]      Stevo Martinez CNP Nurse Practitioner - Family Follow up in 1 week(s)  [address]  [facility] [id number] [phone]      Pilar Randall MD Internal Medicine - Endocrinology,Diabetes,Metabolism Follow up in 2 week(s)  [address] [facility] [id number] [phone]              Condition: Stable  Discharge to: Home - self care    Discharge instructions discussed with the patient or surrogate  decision maker who verbalized understanding, agreed with the therapeutic plan and had the opportunity to ask questions.    Sayra Phan MD   [employer]: [id number]  1/12/2025  3:10 PM

## 2025-02-07 RX ORDER — MINOXIDIL 2.5 MG/1
2.5 TABLET ORAL DAILY
Qty: 90 TABLET | Refills: 1 | Status: SHIPPED | OUTPATIENT
Start: 2025-02-07

## 2025-02-07 RX ORDER — CARVEDILOL 25 MG/1
50 TABLET ORAL 2 TIMES DAILY WITH MEALS
Qty: 360 TABLET | Refills: 1 | Status: SHIPPED | OUTPATIENT
Start: 2025-02-07

## 2025-02-07 RX ORDER — FUROSEMIDE 40 MG/1
40 TABLET ORAL DAILY
Qty: 90 TABLET | Refills: 1 | Status: SHIPPED | OUTPATIENT
Start: 2025-02-07

## 2025-02-07 NOTE — TELEPHONE ENCOUNTER
Rx Refill Note  Requested Prescriptions     Pending Prescriptions Disp Refills    furosemide (LASIX) 40 MG tablet 90 tablet 1     Sig: Take 1 tablet by mouth Daily.    minoxidil (LONITEN) 2.5 MG tablet 90 tablet 1     Sig: Take 1 tablet by mouth Daily.    carvedilol (COREG) 25 MG tablet 360 tablet 1     Sig: Take 2 tablets by mouth 2 (Two) Times a Day With Meals.      Last office visit with prescribing clinician: 01/24/2025  Last telemedicine visit with prescribing clinician: Visit date not found   Next office visit with prescribing clinician: 4/14/2025                        Would you like a call back once the refill request has been completed: [x] Yes [] No [] N/A    If the office needs to give you a call back, can they leave a voicemail: [] Yes [] No [x] N/A    Pharmacy Info    Last Fill Date:   Rx Written Date:   Prescribed Qty:   Additional Details from Pharmacy:       Carrol Obrien MA  02/07/25, 12:24 EST

## 2025-03-27 RX ORDER — SPIRONOLACTONE 25 MG/1
25 TABLET ORAL DAILY
Qty: 90 TABLET | Refills: 1 | Status: SHIPPED | OUTPATIENT
Start: 2025-03-27

## 2025-04-11 PROBLEM — I25.10 CORONARY ARTERY DISEASE INVOLVING NATIVE CORONARY ARTERY OF NATIVE HEART WITHOUT ANGINA PECTORIS: Status: ACTIVE | Noted: 2025-04-11

## 2025-04-11 PROBLEM — I10 BENIGN ESSENTIAL HYPERTENSION: Status: ACTIVE | Noted: 2025-04-11

## 2025-04-11 PROBLEM — E78.5 HYPERLIPIDEMIA LDL GOAL <70: Status: ACTIVE | Noted: 2025-04-11

## 2025-04-11 PROBLEM — I34.0 NONRHEUMATIC MITRAL VALVE REGURGITATION: Status: ACTIVE | Noted: 2025-04-11

## 2025-04-11 NOTE — ASSESSMENT & PLAN NOTE
Hypertension is stable and controlled. BP average around 120's.   Continue current treatment regimen.  Dietary sodium restriction.  Weight loss.  Ambulatory blood pressure monitoring.  Blood pressure will be reassessed in 3 months.  Will continue Entresto 24-26 mg twice a day, hydrochlorothiazide 25 mg once a day, spironolactone 25 mg once a day, minoxidil 2.5 mg daily  Orders:    Entresto 24-26 MG tablet; Take 1 tablet by mouth 2 (Two) Times a Day.    Lipid Panel; Future    CBC & Differential; Future    Basic Metabolic Panel; Future

## 2025-04-11 NOTE — ASSESSMENT & PLAN NOTE
Lipid abnormalities are stable    Plan:  Continue same medication/s without change.      Discussed medication dosage, use, side effects, and goals of treatment in detail.    Counseled patient on lifestyle modifications to help control hyperlipidemia.   Advised patient to exercise for 150 minutes weekly. (30 minute brisk walk, 5 days a week for example)    Patient Treatment Goals:   LDL goal is less than 55    Followup in 3 months.  Continue pravastatin 40 mg once a day  Orders:    Entresto 24-26 MG tablet; Take 1 tablet by mouth 2 (Two) Times a Day.    Lipid Panel; Future    CBC & Differential; Future    Basic Metabolic Panel; Future

## 2025-04-11 NOTE — ASSESSMENT & PLAN NOTE
Echocardiogram will be repeated once a year as discussed with patient about timing of surgery.  Orders:    Entresto 24-26 MG tablet; Take 1 tablet by mouth 2 (Two) Times a Day.    Lipid Panel; Future    CBC & Differential; Future    Basic Metabolic Panel; Future

## 2025-04-11 NOTE — PROGRESS NOTES
Cardiology Follow-Up Note     Name: Parish Silva  :   1955  PCP: Provider, No Known  Date:   2025  Department: [facility] CARD [facility] CARDIOLOGY  [address]  [facility] [address]  Fax [phone]  Phone [phone]    Chief Complaint   Patient presents with    Coronary Artery Disease    Hypertension    Hyperlipidemia       Subjective     History of Present Illness  Parish Silva is a 69 y.o. male who presents today for 3-month follow-up. Doing well, no chest pain, No shortness of breath.    Problem list:  CAD  Cath 2022: Abnormal stress test, EF 55%, stent LAD  Cath 3/8/2018: 50% LAD stenosis, nonischemic cardiomyopathy, EF 40%  Nonischemic cardiomyopathy  Echo 7/15/2024: EF 44%, moderate LVH, moderate MR, moderate TR  Paroxysmal A-fib  Sick sinus syndrome s/p PPM  [facility] single-chamber PPM-last in office interrogation 5/15/2024, 9 years till [facility]  Moderate MR  Moderate TR  Hypertension  CKD 3  2025: GFR 59  Hyperlipidemia  2025: LDL 31  Diabetes type 2  : A1c 6.1%  Obesity  On Wegovy    Past Medical History:   Diagnosis Date    Arthritis     Atrial fibrillation     CAD (coronary artery disease)     s/p stent 2006, 2022    Cardiomyopathy     Congestive heart failure (CHF)     Edema     Erectile dysfunction     Gout     History of intravascular stent placement     Hyperlipidemia     Hypertension     Hypothyroidism     Influenza vaccine needed     Kidney disease     Mitral regurgitation     Obesity     Pacemaker     Shortness of breath     Sleep apnea     Thyroid disease     Tricuspid regurgitation       Past Surgical History:   Procedure Laterality Date    CARDIAC CATHETERIZATION  2022    [facility] PAUL/IOP: Known CAD, ABN stress study/EF 55%.  Successful stent to LAD.    CARDIAC CATHETERIZATION  2018    [facility]/PAUL/EF 40%/ABNORMAL LEXISCAN, SOB/LAD MID 50%  STENOSIS/NONCRITICAL CAD/NONISCHEMIC  CARDIOMYOPATHY     CAROTID STENT  2006    PCI TO RCA    ICD GENERATOR REPLACEMENT  06/01/2022    [facility] MODEL [id number], SERIAL #[id number]    PACEMAKER IMPLANTATION  07/03/2013    [facility]    TONSILLECTOMY         Current Outpatient Medications:     allopurinol (ZYLOPRIM) 100 MG tablet, Take 1 tablet by mouth Daily., Disp: , Rfl:     apixaban (ELIQUIS) 5 MG tablet tablet, Every 12 (Twelve) Hours., Disp: , Rfl:     Calcium Carb-Cholecalciferol (Oyster Shell Calcium/D) 500-10 MG-MCG tablet tablet, Take 1 tablet by mouth Daily., Disp: , Rfl:     carvedilol (COREG) 25 MG tablet, Take 2 tablets by mouth 2 (Two) Times a Day With Meals., Disp: 360 tablet, Rfl: 1    cetirizine (zyrTEC) 10 MG tablet, Take 1 tablet by mouth 2 (Two) Times a Day., Disp: , Rfl:     Cholecalciferol (Vitamin D) 50 MCG (2000 UT) capsule, vitamin D3 2,000 unit-folic acid 1 mg tablet  Take 1 tablet every day by oral route., Disp: , Rfl:     colchicine 0.6 MG tablet, Take 1 tablet by mouth Daily., Disp: , Rfl:     Entresto 24-26 MG tablet, 2 (two) times a day., Disp: , Rfl:     Farxiga 10 MG tablet, 10 mg., Disp: , Rfl:     furosemide (LASIX) 40 MG tablet, Take 1 tablet by mouth Daily., Disp: 90 tablet, Rfl: 1    levothyroxine (SYNTHROID, LEVOTHROID) 137 MCG tablet, Take 2 tablets by mouth Daily., Disp: 180 tablet, Rfl: 3    minoxidil (LONITEN) 2.5 MG tablet, Take 1 tablet by mouth Daily., Disp: 90 tablet, Rfl: 1    nitroglycerin (NITROSTAT) 0.4 MG SL tablet, nitroglycerin 0.4 mg sublingual tablet  Place 1 tablet as needed by sublingual route as directed., Disp: , Rfl:     potassium chloride (KLOR-CON M20) 20 MEQ CR tablet, Take 1 tablet by mouth Every 12 (Twelve) Hours., Disp: , Rfl:     rosuvastatin (CRESTOR) 20 MG tablet, Take 1 tablet by mouth every night at bedtime., Disp: , Rfl:     spironolactone (ALDACTONE) 25 MG tablet, Take 1 tablet by mouth Daily., Disp: 90 tablet, Rfl: 1    Wegovy 1 MG/0.5ML solution auto-injector, Inject 0.5 mL under the skin into  "the appropriate area as directed 1 (One) Time Per Week., Disp: , Rfl:     Brilinta 90 MG tablet tablet, 2 (Two) Times a Day. (Patient not taking: Reported on 4/14/2025), Disp: , Rfl:     hydrochlorothiazide (HYDRODIURIL) 25 MG tablet, Take 1 tablet by mouth Daily. (Patient not taking: Reported on 4/14/2025), Disp: , Rfl:     pravastatin (PRAVACHOL) 40 MG tablet, 2 tablets daily (Patient not taking: Reported on 4/14/2025), Disp: , Rfl:     Objective     Vital Signs:  /93 (BP Location: Right arm, Patient Position: Sitting)   Pulse 62   Ht 185.4 cm (73\")   Wt (!) 166 kg (366 lb 1.6 oz)   BMI 48.30 kg/m²   Estimated body mass index is 48.3 kg/m² as calculated from the following:    Height as of this encounter: 185.4 cm (73\").    Weight as of this encounter: 166 kg (366 lb 1.6 oz).       Class 3 Severe Obesity (BMI >=40). Obesity-related health conditions include the following: hypertension, diabetes mellitus, dyslipidemias, and GERD. Obesity is worsening. BMI is is above average; BMI management plan is completed. We discussed low calorie, low carb based diet program, portion control, and increasing exercise.      Vitals reviewed.   Constitutional:       Appearance: Normal and healthy appearance.   Eyes:      Pupils: Pupils are equal, round, and reactive to light.   Pulmonary:      Effort: Pulmonary effort is normal.   Chest:      Chest wall: Not tender to palpatation.   Cardiovascular:      PMI at left midclavicular line. Normal rate. Regular rhythm.      No gallop.    Pulses:     Intact distal pulses.   Edema:     Peripheral edema absent.   Skin:     General: Skin is warm.   Psychiatric:         Behavior: Behavior is cooperative.              Data Review:   Lab Results   Component Value Date    GLUCOSE 103 (H) 09/06/2017    BUN 16 09/06/2017    CREATININE 1.30 09/06/2017    EGFRIFNONA 56 (L) 09/06/2017    EGFRIFAFRI 68 09/06/2017    BCR 12.3 09/06/2017    K 4.8 09/06/2017    CO2 26.0 09/06/2017    CALCIUM 9.7 " "09/06/2017    ALBUMIN 3.70 09/06/2017    AST 27 09/06/2017    ALT 32 09/06/2017     No results found for: \"CHOL\", \"CHLPL\", \"TRIG\", \"HDL\", \"LDL\", \"LDLDIRECT\"   Lab Results   Component Value Date    WBC 7.31 09/04/2017    RBC 5.18 09/04/2017    HGB 16.5 09/04/2017    HCT 47.8 09/04/2017    MCV 92.3 09/04/2017     09/04/2017     Lab Results   Component Value Date    TSH 2.097 11/01/2024     No results found for: \"HGBA1C\"  No results found for: \"INR\", \"PROTIME\"    Labs 4/11/25 LDL 38, LFT's OK.CBC Ok.  GFR 59  Assessment and Plan     Assessment & Plan  Coronary artery disease involving native coronary artery of native heart without angina pectoris  Coronary Artery Disease (OPTIONAL): Coronary artery disease is stable.  Continue current treatment regimen. Dietary sodium restriction. Weight loss. Regular aerobic exercise.  Cardiac status will be reassessed in 3 months.  Continue Eliquis 5 mg twice a day  Orders:    Entresto 24-26 MG tablet; Take 1 tablet by mouth 2 (Two) Times a Day.    Lipid Panel; Future    CBC & Differential; Future    Basic Metabolic Panel; Future    Benign essential hypertension  Hypertension is stable and controlled. BP average around 120's.   Continue current treatment regimen.  Dietary sodium restriction.  Weight loss.  Ambulatory blood pressure monitoring.  Blood pressure will be reassessed in 3 months.  Will continue Entresto 24-26 mg twice a day, hydrochlorothiazide 25 mg once a day, spironolactone 25 mg once a day, minoxidil 2.5 mg daily  Orders:    Entresto 24-26 MG tablet; Take 1 tablet by mouth 2 (Two) Times a Day.    Lipid Panel; Future    CBC & Differential; Future    Basic Metabolic Panel; Future    Hyperlipidemia LDL goal <70   Lipid abnormalities are stable    Plan:  Continue same medication/s without change.      Discussed medication dosage, use, side effects, and goals of treatment in detail.    Counseled patient on lifestyle modifications to help control hyperlipidemia. "   Advised patient to exercise for 150 minutes weekly. (30 minute brisk walk, 5 days a week for example)    Patient Treatment Goals:   LDL goal is less than 55    Followup in 3 months.  Continue pravastatin 40 mg once a day  Orders:    Entresto 24-26 MG tablet; Take 1 tablet by mouth 2 (Two) Times a Day.    Lipid Panel; Future    CBC & Differential; Future    Basic Metabolic Panel; Future    Nonrheumatic mitral valve regurgitation  Echocardiogram will be repeated once a year as discussed with patient about timing of surgery.  Orders:    Entresto 24-26 MG tablet; Take 1 tablet by mouth 2 (Two) Times a Day.    Lipid Panel; Future    CBC & Differential; Future    Basic Metabolic Panel; Future    Paroxysmal atrial fibrillation  Continue Eliquis 5 mg twice a day  Orders:    Entresto 24-26 MG tablet; Take 1 tablet by mouth 2 (Two) Times a Day.    Lipid Panel; Future    CBC & Differential; Future    Basic Metabolic Panel; Future      Advised to continue current cardiac medications. Please notify of any issues. Discussed with the patient compliance with medical management and follow-up.     Follow Up  Return in about 3 months (around 7/14/2025).    Call if you have any significant symptoms or go to the [facility] Emergency room if possible.     Brady Gaxiola MD, FACC,[facility].  [facility]    Part of this note may be an electronic transcription/translation of spoken language to printed text using the Dragon Dictation System.

## 2025-04-11 NOTE — ASSESSMENT & PLAN NOTE
Coronary Artery Disease (OPTIONAL): Coronary artery disease is stable.  Continue current treatment regimen. Dietary sodium restriction. Weight loss. Regular aerobic exercise.  Cardiac status will be reassessed in 3 months.  Continue Eliquis 5 mg twice a day  Orders:    Entresto 24-26 MG tablet; Take 1 tablet by mouth 2 (Two) Times a Day.    Lipid Panel; Future    CBC & Differential; Future    Basic Metabolic Panel; Future

## 2025-04-14 ENCOUNTER — OFFICE VISIT (OUTPATIENT)
Age: 70
End: 2025-04-14
Payer: MEDICARE

## 2025-04-14 VITALS
BODY MASS INDEX: 41.75 KG/M2 | WEIGHT: 315 LBS | HEART RATE: 62 BPM | SYSTOLIC BLOOD PRESSURE: 146 MMHG | DIASTOLIC BLOOD PRESSURE: 93 MMHG | HEIGHT: 73 IN

## 2025-04-14 DIAGNOSIS — I34.0 NONRHEUMATIC MITRAL VALVE REGURGITATION: ICD-10-CM

## 2025-04-14 DIAGNOSIS — I25.10 CORONARY ARTERY DISEASE INVOLVING NATIVE CORONARY ARTERY OF NATIVE HEART WITHOUT ANGINA PECTORIS: Primary | ICD-10-CM

## 2025-04-14 DIAGNOSIS — E78.5 HYPERLIPIDEMIA LDL GOAL <70: ICD-10-CM

## 2025-04-14 DIAGNOSIS — I10 BENIGN ESSENTIAL HYPERTENSION: ICD-10-CM

## 2025-04-14 DIAGNOSIS — I48.0 PAROXYSMAL ATRIAL FIBRILLATION: ICD-10-CM

## 2025-04-14 PROCEDURE — 3080F DIAST BP >= 90 MM HG: CPT | Performed by: INTERNAL MEDICINE

## 2025-04-14 PROCEDURE — 99214 OFFICE O/P EST MOD 30 MIN: CPT | Performed by: INTERNAL MEDICINE

## 2025-04-14 PROCEDURE — G2211 COMPLEX E/M VISIT ADD ON: HCPCS | Performed by: INTERNAL MEDICINE

## 2025-04-14 PROCEDURE — 3077F SYST BP >= 140 MM HG: CPT | Performed by: INTERNAL MEDICINE

## 2025-04-14 RX ORDER — ROSUVASTATIN CALCIUM 20 MG/1
20 TABLET, COATED ORAL
COMMUNITY
Start: 2025-02-05 | End: 2025-04-14 | Stop reason: SDUPTHER

## 2025-04-14 RX ORDER — POTASSIUM CHLORIDE 1500 MG/1
1 TABLET, EXTENDED RELEASE ORAL EVERY 12 HOURS SCHEDULED
COMMUNITY
Start: 2025-04-09

## 2025-04-14 RX ORDER — CETIRIZINE HYDROCHLORIDE 10 MG/1
10 TABLET ORAL 2 TIMES DAILY
COMMUNITY

## 2025-04-14 RX ORDER — SEMAGLUTIDE 1 MG/.5ML
1 INJECTION, SOLUTION SUBCUTANEOUS WEEKLY
COMMUNITY
Start: 2025-04-12

## 2025-04-14 RX ORDER — CALCIUM CARBONATE/VITAMIN D3 500 MG-10
1 TABLET ORAL DAILY
COMMUNITY

## 2025-04-14 RX ORDER — SACUBITRIL AND VALSARTAN 24; 26 MG/1; MG/1
1 TABLET, FILM COATED ORAL 2 TIMES DAILY
Qty: 180 TABLET | Refills: 1 | Status: SHIPPED | OUTPATIENT
Start: 2025-04-14

## 2025-04-14 RX ORDER — ROSUVASTATIN CALCIUM 20 MG/1
20 TABLET, COATED ORAL
Qty: 90 TABLET | Refills: 1 | Status: SHIPPED | OUTPATIENT
Start: 2025-04-14

## 2025-04-14 NOTE — TELEPHONE ENCOUNTER
Rx Refill Note  Requested Prescriptions     Pending Prescriptions Disp Refills    rosuvastatin (CRESTOR) 20 MG tablet 90 tablet 1     Sig: Take 1 tablet by mouth every night at bedtime.      Patient needs refill on medication listed above. I could not send in at the time of appt today, due to multiple medications needing to go to different pharmacies.     Last office visit with prescribing clinician: 4/14/2025   Last telemedicine visit with prescribing clinician: Visit date not found   Next office visit with prescribing clinician: 7/14/2025                        Would you like a call back once the refill request has been completed: [] Yes [] No [] N/A    If the office needs to give you a call back, can they leave a voicemail: [] Yes [] No [] N/A    Pharmacy Info    Last Fill Date:   Rx Written Date:   Prescribed Qty:   Additional Details from Pharmacy:       Carrol Obrien MA  04/14/25, 14:57 EDT

## 2025-05-12 ENCOUNTER — CLINICAL SUPPORT NO REQUIREMENTS (OUTPATIENT)
Age: 70
End: 2025-05-12
Payer: MEDICARE

## 2025-07-07 RX ORDER — SEMAGLUTIDE 1 MG/.5ML
1 INJECTION, SOLUTION SUBCUTANEOUS WEEKLY
Qty: 12 ML | Refills: 1 | Status: SHIPPED | OUTPATIENT
Start: 2025-07-07

## 2025-07-07 NOTE — TELEPHONE ENCOUNTER
Rx Refill Note  Requested Prescriptions     Pending Prescriptions Disp Refills    apixaban (ELIQUIS) 5 MG tablet tablet 180 tablet 1     Sig: Take 1 tablet by mouth Every 12 (Twelve) Hours.    Wegovy 1 MG/0.5ML solution auto-injector 4.5 mL 1     Sig: Inject 0.5 mL under the skin into the appropriate area as directed 1 (One) Time Per Week.     Patient's wife lvm on clinical line requesting refills on medications listed above. Per last office note, patient is to continue taking as directed. Patient needs medications sent to [facility] pharmacy on [facility] in [place].    Last office visit with prescribing clinician: 4/14/2025   Last telemedicine visit with prescribing clinician: Visit date not found   Next office visit with prescribing clinician: 7/14/2025                        Would you like a call back once the refill request has been completed: [] Yes [] No [] N/A    If the office needs to give you a call back, can they leave a voicemail: [] Yes [] No [] N/A    Pharmacy Info    Last Fill Date:   Rx Written Date:   Prescribed Qty:   Additional Details from Pharmacy:       Carrol Obrien MA  07/07/25, 15:04 EDT

## 2025-07-12 PROBLEM — I48.0 PAROXYSMAL ATRIAL FIBRILLATION: Status: ACTIVE | Noted: 2025-07-12

## 2025-07-12 PROBLEM — Z95.0 PRESENCE OF BIVENTRICULAR CARDIAC PACEMAKER: Status: ACTIVE | Noted: 2025-07-12

## 2025-07-12 NOTE — ASSESSMENT & PLAN NOTE
The patient remote check on pacemaker on 5/14/2025 reveals no events.  The battery life is between 8 to 9 years.  Orders:    furosemide (LASIX) 40 MG tablet; Take 1 tablet by mouth Daily.    spironolactone (ALDACTONE) 25 MG tablet; Take 1 tablet by mouth Daily.    minoxidil (LONITEN) 2.5 MG tablet; Take 1 tablet by mouth Daily.    carvedilol (COREG) 25 MG tablet; Take 2 tablets by mouth 2 (Two) Times a Day With Meals.

## 2025-07-12 NOTE — ASSESSMENT & PLAN NOTE
Discussed with patient about the findings of mitral regurgitation and follow-ups with echocardiogram.  Orders:    furosemide (LASIX) 40 MG tablet; Take 1 tablet by mouth Daily.    spironolactone (ALDACTONE) 25 MG tablet; Take 1 tablet by mouth Daily.    minoxidil (LONITEN) 2.5 MG tablet; Take 1 tablet by mouth Daily.    carvedilol (COREG) 25 MG tablet; Take 2 tablets by mouth 2 (Two) Times a Day With Meals.

## 2025-07-12 NOTE — ASSESSMENT & PLAN NOTE
Lipid abnormalities are stable    Plan:  Continue same medication/s without change.      Discussed medication dosage, use, side effects, and goals of treatment in detail.    Counseled patient on lifestyle modifications to help control hyperlipidemia.   Advised patient to exercise for 150 minutes weekly. (30 minute brisk walk, 5 days a week for example)    Patient Treatment Goals:   LDL goal is less than 55    Followup in 3 months.  Continue rosuvastatin 20 mg once a day.  Orders:    furosemide (LASIX) 40 MG tablet; Take 1 tablet by mouth Daily.    spironolactone (ALDACTONE) 25 MG tablet; Take 1 tablet by mouth Daily.    minoxidil (LONITEN) 2.5 MG tablet; Take 1 tablet by mouth Daily.    carvedilol (COREG) 25 MG tablet; Take 2 tablets by mouth 2 (Two) Times a Day With Meals.

## 2025-07-12 NOTE — PROGRESS NOTES
Cardiology Follow-Up Note     Name: Parish Silva  :   1955  PCP: Provider, No Known  Date:   2025  Department: [facility] [place] [facility] CARDIOLOGY  [address]  [facility] [id number]  Fax [phone]  Phone [phone]    Chief Complaint   Patient presents with    Hypertension    Hyperlipidemia     Problem list:  CAD  Cath 2022: Abnormal stress test, EF 55%, stent LAD  Cath 3/8/2018: 50% LAD stenosis, nonischemic cardiomyopathy, EF 40%  Nonischemic cardiomyopathy  Echo 7/15/2024: EF 44%, moderate LVH, moderate MR, moderate TR  Paroxysmal A-fib  Sick sinus syndrome s/p PPM  [facility] single-chamber PPM-last in office interrogation 5/15/2024, 9 years till [facility]  Moderate MR  Moderate TR  Hypertension  CKD 3  2025: GFR 59  Hyperlipidemia  2025: LDL 31  Diabetes type 2  : A1c 6.1%  Obesity  On Wegovy    Subjective     History of Present Illness  Parish Silva is a 69 y.o. male who presents today for 3-month follow-up. Doing well, no chest pain, No shortness of breath.      Past Medical History:   Diagnosis Date    Arthritis     Atrial fibrillation     CAD (coronary artery disease)     s/p stent 2006, 2022    Cardiomyopathy     Congestive heart failure (CHF)     Edema     Erectile dysfunction     Gout     History of intravascular stent placement     Hyperlipidemia     Hypertension     Hypothyroidism     Influenza vaccine needed     Kidney disease     Mitral regurgitation     Obesity     Pacemaker     Shortness of breath     Sleep apnea     Thyroid disease     Tricuspid regurgitation       Past Surgical History:   Procedure Laterality Date    CARDIAC CATHETERIZATION  2022    SJE PAUL/IOP: Known CAD, ABN stress study/EF 55%.  Successful stent to LAD.    CARDIAC CATHETERIZATION  2018    SJE/PAUL/EF 40%/ABNORMAL LEXISCAN, SOB/LAD MID 50%  STENOSIS/NONCRITICAL CAD/NONISCHEMIC  CARDIOMYOPATHY    CAROTID STENT       PCI TO RCA    ICD GENERATOR REPLACEMENT  06/01/2022    [facility] ANTHONY/ST SURI MODEL [id number], SERIAL #[id number]    PACEMAKER IMPLANTATION  07/03/2013    ST S[facility]    TONSILLECTOMY         Current Outpatient Medications:     allopurinol (ZYLOPRIM) 100 MG tablet, Take 1 tablet by mouth Daily., Disp: , Rfl:     apixaban (ELIQUIS) 5 MG tablet tablet, Take 1 tablet by mouth Every 12 (Twelve) Hours., Disp: 180 tablet, Rfl: 1    Calcium Carb-Cholecalciferol (Oyster Shell Calcium/D) 500-10 MG-MCG tablet tablet, Take 1 tablet by mouth Daily., Disp: , Rfl:     carvedilol (COREG) 25 MG tablet, Take 2 tablets by mouth 2 (Two) Times a Day With Meals., Disp: 360 tablet, Rfl: 1    cetirizine (zyrTEC) 10 MG tablet, Take 1 tablet by mouth 2 (Two) Times a Day., Disp: , Rfl:     Cholecalciferol (Vitamin D) 50 MCG (2000 UT) capsule, vitamin D3 2,000 unit-folic acid 1 mg tablet  Take 1 tablet every day by oral route., Disp: , Rfl:     colchicine 0.6 MG tablet, Take 1 tablet by mouth Daily., Disp: , Rfl:     Entresto 24-26 MG tablet, Take 1 tablet by mouth 2 (Two) Times a Day., Disp: 180 tablet, Rfl: 1    Farxiga 10 MG tablet, 10 mg., Disp: , Rfl:     furosemide (LASIX) 40 MG tablet, Take 1 tablet by mouth Daily., Disp: 90 tablet, Rfl: 1    levothyroxine (SYNTHROID, LEVOTHROID) 137 MCG tablet, Take 2 tablets by mouth Daily., Disp: 180 tablet, Rfl: 3    minoxidil (LONITEN) 2.5 MG tablet, Take 1 tablet by mouth Daily., Disp: 90 tablet, Rfl: 1    nitroglycerin (NITROSTAT) 0.4 MG SL tablet, nitroglycerin 0.4 mg sublingual tablet  Place 1 tablet as needed by sublingual route as directed., Disp: , Rfl:     potassium chloride (KLOR-CON M20) 20 MEQ CR tablet, Take 1 tablet by mouth Every 12 (Twelve) Hours., Disp: , Rfl:     rosuvastatin (CRESTOR) 20 MG tablet, Take 1 tablet by mouth every night at bedtime., Disp: 90 tablet, Rfl: 1    spironolactone (ALDACTONE) 25 MG tablet, Take 1 tablet by mouth Daily., Disp: 90 tablet, Rfl: 1    Wegovy 1 MG/0.5ML solution  "auto-injector, Inject 0.5 mL under the skin into the appropriate area as directed 1 (One) Time Per Week., Disp: 12 mL, Rfl: 1    Objective     Vital Signs:  /55 (BP Location: Right arm, Patient Position: Sitting, Cuff Size: Adult)   Pulse 66   Ht 185.4 cm (73\")   Wt (!) 162 kg (358 lb)   BMI 47.23 kg/m²   Estimated body mass index is 47.23 kg/m² as calculated from the following:    Height as of this encounter: 185.4 cm (73\").    Weight as of this encounter: 162 kg (358 lb).       Class 3 Severe Obesity (BMI >=40). Obesity-related health conditions include the following: hypertension, diabetes mellitus, dyslipidemias, and GERD. Obesity is worsening. BMI is is above average; BMI management plan is completed. We discussed low calorie, low carb based diet program, portion control, and increasing exercise.      Vitals reviewed.   Constitutional:       Appearance: Normal and healthy appearance.   Eyes:      Pupils: Pupils are equal, round, and reactive to light.   Pulmonary:      Effort: Pulmonary effort is normal.   Chest:      Chest wall: Not tender to palpatation.   Cardiovascular:      PMI at left midclavicular line. Normal rate. Regular rhythm.      No gallop.    Pulses:     Intact distal pulses.   Edema:     Peripheral edema absent.   Skin:     General: Skin is warm.   Psychiatric:         Behavior: Behavior is cooperative.              Data Review:   Lab Results   Component Value Date    GLUCOSE 103 (H) 09/06/2017    BUN 16 09/06/2017    CREATININE 1.30 09/06/2017    EGFRIFNONA 56 (L) 09/06/2017    EGFRIFAFRI 68 09/06/2017    BCR 12.3 09/06/2017    K 4.8 09/06/2017    CO2 26.0 09/06/2017    CALCIUM 9.7 09/06/2017    ALBUMIN 3.70 09/06/2017    AST 27 09/06/2017    ALT 32 09/06/2017     No results found for: \"CHOL\", \"CHLPL\", \"TRIG\", \"HDL\", \"LDL\", \"LDLDIRECT\"   Lab Results   Component Value Date    WBC 7.31 09/04/2017    RBC 5.18 09/04/2017    HGB 16.5 09/04/2017    HCT 47.8 09/04/2017    MCV 92.3 09/04/2017 " "    09/04/2017     Lab Results   Component Value Date    TSH 2.097 11/01/2024     No results found for: \"HGBA1C\"  No results found for: \"INR\", \"PROTIME\"    Labs 4/11/25 LDL 38, LFT's OK.CBC Ok.  GFR 59  Assessment and Plan     Assessment & Plan  Coronary artery disease involving native coronary artery of native heart without angina pectoris  Coronary Artery Disease (OPTIONAL): Coronary artery disease is stable.  Continue current treatment regimen. Dietary sodium restriction. Weight loss.  Cardiac status will be reassessed in 3 months.  The patient is on Eliquis 5 mg twice a day.  Will avoid at the moment adding antiplatelet therapy due to risk of bleeding.  Orders:    furosemide (LASIX) 40 MG tablet; Take 1 tablet by mouth Daily.    spironolactone (ALDACTONE) 25 MG tablet; Take 1 tablet by mouth Daily.    minoxidil (LONITEN) 2.5 MG tablet; Take 1 tablet by mouth Daily.    carvedilol (COREG) 25 MG tablet; Take 2 tablets by mouth 2 (Two) Times a Day With Meals.    Benign essential hypertension  Hypertension is stable and controlled CKD stage 3. Patient sees Nephrology.   Continue current treatment regimen.  Weight loss.  Ambulatory blood pressure monitoring.  Blood pressure will be reassessed in 3 months.  Continue carvedilol 25 mg twice a day, Entresto 24-26 mg twice a day, spironolactone 25 mg once a day.  Minoxidil 2.5 mg once a day.  Orders:    furosemide (LASIX) 40 MG tablet; Take 1 tablet by mouth Daily.    spironolactone (ALDACTONE) 25 MG tablet; Take 1 tablet by mouth Daily.    minoxidil (LONITEN) 2.5 MG tablet; Take 1 tablet by mouth Daily.    carvedilol (COREG) 25 MG tablet; Take 2 tablets by mouth 2 (Two) Times a Day With Meals.    Hyperlipidemia LDL goal <70   Lipid abnormalities are stable    Plan:  Continue same medication/s without change.      Discussed medication dosage, use, side effects, and goals of treatment in detail.    Counseled patient on lifestyle modifications to help control " hyperlipidemia.   Advised patient to exercise for 150 minutes weekly. (30 minute brisk walk, 5 days a week for example)    Patient Treatment Goals:   LDL goal is less than 55    Followup in 3 months.  Continue rosuvastatin 20 mg once a day.  Orders:    furosemide (LASIX) 40 MG tablet; Take 1 tablet by mouth Daily.    spironolactone (ALDACTONE) 25 MG tablet; Take 1 tablet by mouth Daily.    minoxidil (LONITEN) 2.5 MG tablet; Take 1 tablet by mouth Daily.    carvedilol (COREG) 25 MG tablet; Take 2 tablets by mouth 2 (Two) Times a Day With Meals.    Nonrheumatic mitral valve regurgitation  Discussed with patient about the findings of mitral regurgitation and follow-ups with echocardiogram.  Orders:    furosemide (LASIX) 40 MG tablet; Take 1 tablet by mouth Daily.    spironolactone (ALDACTONE) 25 MG tablet; Take 1 tablet by mouth Daily.    minoxidil (LONITEN) 2.5 MG tablet; Take 1 tablet by mouth Daily.    carvedilol (COREG) 25 MG tablet; Take 2 tablets by mouth 2 (Two) Times a Day With Meals.    Paroxysmal atrial fibrillation  Patient is Eliquis 5 mg twice a day.  Orders:    furosemide (LASIX) 40 MG tablet; Take 1 tablet by mouth Daily.    spironolactone (ALDACTONE) 25 MG tablet; Take 1 tablet by mouth Daily.    minoxidil (LONITEN) 2.5 MG tablet; Take 1 tablet by mouth Daily.    carvedilol (COREG) 25 MG tablet; Take 2 tablets by mouth 2 (Two) Times a Day With Meals.    Presence of biventricular cardiac pacemaker  The patient remote check on pacemaker on 5/14/2025 reveals no events.  The battery life is between 8 to 9 years.  Orders:    furosemide (LASIX) 40 MG tablet; Take 1 tablet by mouth Daily.    spironolactone (ALDACTONE) 25 MG tablet; Take 1 tablet by mouth Daily.    minoxidil (LONITEN) 2.5 MG tablet; Take 1 tablet by mouth Daily.    carvedilol (COREG) 25 MG tablet; Take 2 tablets by mouth 2 (Two) Times a Day With Meals.      Advised to continue current cardiac medications. Please notify of any issues. Discussed  with the patient compliance with medical management and follow-up.     Follow Up  Return in about 3 months (around 10/14/2025).    Call if you have any significant symptoms or go to the [facility] if possible.     Brady Gaxiola MD, FACC,[facility].  [place] [facility]    Part of this note may be an electronic transcription/translation of spoken language to printed text using the Dragon Dictation System.

## 2025-07-12 NOTE — ASSESSMENT & PLAN NOTE
Coronary Artery Disease (OPTIONAL): Coronary artery disease is stable.  Continue current treatment regimen. Dietary sodium restriction. Weight loss.  Cardiac status will be reassessed in 3 months.  The patient is on Eliquis 5 mg twice a day.  Will avoid at the moment adding antiplatelet therapy due to risk of bleeding.  Orders:    furosemide (LASIX) 40 MG tablet; Take 1 tablet by mouth Daily.    spironolactone (ALDACTONE) 25 MG tablet; Take 1 tablet by mouth Daily.    minoxidil (LONITEN) 2.5 MG tablet; Take 1 tablet by mouth Daily.    carvedilol (COREG) 25 MG tablet; Take 2 tablets by mouth 2 (Two) Times a Day With Meals.

## 2025-07-12 NOTE — ASSESSMENT & PLAN NOTE
Hypertension is stable and controlled CKD stage 3. Patient sees Nephrology.   Continue current treatment regimen.  Weight loss.  Ambulatory blood pressure monitoring.  Blood pressure will be reassessed in 3 months.  Continue carvedilol 25 mg twice a day, Entresto 24-26 mg twice a day, spironolactone 25 mg once a day.  Minoxidil 2.5 mg once a day.  Orders:    furosemide (LASIX) 40 MG tablet; Take 1 tablet by mouth Daily.    spironolactone (ALDACTONE) 25 MG tablet; Take 1 tablet by mouth Daily.    minoxidil (LONITEN) 2.5 MG tablet; Take 1 tablet by mouth Daily.    carvedilol (COREG) 25 MG tablet; Take 2 tablets by mouth 2 (Two) Times a Day With Meals.

## 2025-07-12 NOTE — ASSESSMENT & PLAN NOTE
Patient is Eliquis 5 mg twice a day.  Orders:    furosemide (LASIX) 40 MG tablet; Take 1 tablet by mouth Daily.    spironolactone (ALDACTONE) 25 MG tablet; Take 1 tablet by mouth Daily.    minoxidil (LONITEN) 2.5 MG tablet; Take 1 tablet by mouth Daily.    carvedilol (COREG) 25 MG tablet; Take 2 tablets by mouth 2 (Two) Times a Day With Meals.

## 2025-07-14 ENCOUNTER — PATIENT ROUNDING (BHMG ONLY) (OUTPATIENT)
Age: 70
End: 2025-07-14

## 2025-07-14 ENCOUNTER — OFFICE VISIT (OUTPATIENT)
Age: 70
End: 2025-07-14
Payer: MEDICARE

## 2025-07-14 VITALS
WEIGHT: 315 LBS | HEART RATE: 66 BPM | DIASTOLIC BLOOD PRESSURE: 55 MMHG | HEIGHT: 73 IN | SYSTOLIC BLOOD PRESSURE: 106 MMHG | BODY MASS INDEX: 41.75 KG/M2

## 2025-07-14 DIAGNOSIS — I25.10 CORONARY ARTERY DISEASE INVOLVING NATIVE CORONARY ARTERY OF NATIVE HEART WITHOUT ANGINA PECTORIS: Primary | ICD-10-CM

## 2025-07-14 DIAGNOSIS — I48.0 PAROXYSMAL ATRIAL FIBRILLATION: ICD-10-CM

## 2025-07-14 DIAGNOSIS — I34.0 NONRHEUMATIC MITRAL VALVE REGURGITATION: ICD-10-CM

## 2025-07-14 DIAGNOSIS — E78.5 HYPERLIPIDEMIA LDL GOAL <70: ICD-10-CM

## 2025-07-14 DIAGNOSIS — I10 BENIGN ESSENTIAL HYPERTENSION: ICD-10-CM

## 2025-07-14 DIAGNOSIS — Z95.0 PRESENCE OF BIVENTRICULAR CARDIAC PACEMAKER: ICD-10-CM

## 2025-07-14 PROCEDURE — 99214 OFFICE O/P EST MOD 30 MIN: CPT | Performed by: INTERNAL MEDICINE

## 2025-07-14 PROCEDURE — 3074F SYST BP LT 130 MM HG: CPT | Performed by: INTERNAL MEDICINE

## 2025-07-14 PROCEDURE — 1159F MED LIST DOCD IN RCRD: CPT | Performed by: INTERNAL MEDICINE

## 2025-07-14 PROCEDURE — 1160F RVW MEDS BY RX/DR IN RCRD: CPT | Performed by: INTERNAL MEDICINE

## 2025-07-14 PROCEDURE — 3078F DIAST BP <80 MM HG: CPT | Performed by: INTERNAL MEDICINE

## 2025-07-14 RX ORDER — MINOXIDIL 2.5 MG/1
2.5 TABLET ORAL DAILY
Qty: 90 TABLET | Refills: 1 | Status: SHIPPED | OUTPATIENT
Start: 2025-07-14

## 2025-07-14 RX ORDER — FUROSEMIDE 40 MG/1
40 TABLET ORAL DAILY
Qty: 90 TABLET | Refills: 1 | Status: SHIPPED | OUTPATIENT
Start: 2025-07-14

## 2025-07-14 RX ORDER — CARVEDILOL 25 MG/1
50 TABLET ORAL 2 TIMES DAILY WITH MEALS
Qty: 360 TABLET | Refills: 1 | Status: SHIPPED | OUTPATIENT
Start: 2025-07-14

## 2025-07-14 RX ORDER — SPIRONOLACTONE 25 MG/1
25 TABLET ORAL DAILY
Qty: 90 TABLET | Refills: 1 | Status: SHIPPED | OUTPATIENT
Start: 2025-07-14

## 2025-07-14 NOTE — PROGRESS NOTES
My name is Yuliet Concepcion, and I am the Practice Manager for [facility] [place].    I would like to thank you for being a loyal patient. If you do not mind, I would like to ask you some questions about your recent visit with us. Please feel free to reply if you wish to provide us with feedback on your visit with our practice.    First, could you tell me what went well with your recent visit?    Secondly, we are always looking for ways to make our patients' experiences even better. Do you have any recommendations on what we can do to improve your experience?    Finally, overall were you satisfied with your visit with us as a [facility] facility?    Over the next few days, you will be receiving a Patient Experience Survey. Please consider taking the survey, as it helps [facility] in improving their patient care.    Thank you for taking the time to answer our questions today.    I hope you have a good day.

## 2025-08-13 LAB
MDC_IDC_MSMT_BATTERY_REMAINING_LONGEVITY: 103 MO
MDC_IDC_MSMT_BATTERY_REMAINING_PERCENTAGE: 74 %
MDC_IDC_MSMT_BATTERY_RRT_TRIGGER: 2.6
MDC_IDC_MSMT_BATTERY_STATUS: NORMAL
MDC_IDC_MSMT_BATTERY_VOLTAGE: 3.01
MDC_IDC_MSMT_LEADCHNL_RV_DTM: NORMAL
MDC_IDC_MSMT_LEADCHNL_RV_IMPEDANCE_VALUE: 580
MDC_IDC_MSMT_LEADCHNL_RV_PACING_THRESHOLD_AMPLITUDE: 0.75
MDC_IDC_MSMT_LEADCHNL_RV_PACING_THRESHOLD_POLARITY: NORMAL
MDC_IDC_MSMT_LEADCHNL_RV_PACING_THRESHOLD_PULSEWIDTH: 0.5
MDC_IDC_MSMT_LEADCHNL_RV_SENSING_INTR_AMPL: 12
MDC_IDC_PG_IMPLANT_DTM: NORMAL
MDC_IDC_PG_MFG: NORMAL
MDC_IDC_PG_MODEL: NORMAL
MDC_IDC_PG_SERIAL: NORMAL
MDC_IDC_PG_TYPE: NORMAL
MDC_IDC_SESS_DTM: NORMAL
MDC_IDC_SESS_TYPE: NORMAL
MDC_IDC_SET_BRADY_LOWRATE: 60
MDC_IDC_SET_BRADY_MAX_SENSOR_RATE: 130
MDC_IDC_SET_BRADY_MODE: NORMAL
MDC_IDC_SET_LEADCHNL_RV_PACING_AMPLITUDE: 1
MDC_IDC_SET_LEADCHNL_RV_PACING_POLARITY: NORMAL
MDC_IDC_SET_LEADCHNL_RV_PACING_PULSEWIDTH: 0.5
MDC_IDC_SET_LEADCHNL_RV_SENSING_POLARITY: NORMAL
MDC_IDC_SET_LEADCHNL_RV_SENSING_SENSITIVITY: 2
MDC_IDC_STAT_BRADY_RV_PERCENT_PACED: 89
